# Patient Record
Sex: MALE | Race: BLACK OR AFRICAN AMERICAN | NOT HISPANIC OR LATINO | Employment: OTHER | URBAN - METROPOLITAN AREA
[De-identification: names, ages, dates, MRNs, and addresses within clinical notes are randomized per-mention and may not be internally consistent; named-entity substitution may affect disease eponyms.]

---

## 2017-08-03 ENCOUNTER — ALLSCRIPTS OFFICE VISIT (OUTPATIENT)
Dept: OTHER | Facility: OTHER | Age: 72
End: 2017-08-03

## 2018-01-14 VITALS
SYSTOLIC BLOOD PRESSURE: 140 MMHG | WEIGHT: 208 LBS | BODY MASS INDEX: 27.57 KG/M2 | HEART RATE: 64 BPM | OXYGEN SATURATION: 97 % | DIASTOLIC BLOOD PRESSURE: 78 MMHG | HEIGHT: 73 IN

## 2018-08-08 ENCOUNTER — OFFICE VISIT (OUTPATIENT)
Dept: CARDIOLOGY CLINIC | Facility: CLINIC | Age: 73
End: 2018-08-08
Payer: MEDICARE

## 2018-08-08 VITALS
HEART RATE: 61 BPM | HEIGHT: 74 IN | SYSTOLIC BLOOD PRESSURE: 130 MMHG | DIASTOLIC BLOOD PRESSURE: 68 MMHG | WEIGHT: 205 LBS | BODY MASS INDEX: 26.31 KG/M2

## 2018-08-08 DIAGNOSIS — I44.7 LBBB (LEFT BUNDLE BRANCH BLOCK): Primary | ICD-10-CM

## 2018-08-08 DIAGNOSIS — I10 ESSENTIAL HYPERTENSION: ICD-10-CM

## 2018-08-08 DIAGNOSIS — R91.1 PULMONARY NODULE: ICD-10-CM

## 2018-08-08 PROCEDURE — 99214 OFFICE O/P EST MOD 30 MIN: CPT | Performed by: INTERNAL MEDICINE

## 2018-08-08 PROCEDURE — 93000 ELECTROCARDIOGRAM COMPLETE: CPT | Performed by: INTERNAL MEDICINE

## 2018-08-08 RX ORDER — CITALOPRAM 40 MG/1
20 TABLET ORAL DAILY
COMMUNITY

## 2018-08-08 RX ORDER — BRIMONIDINE TARTRATE 2 MG/ML
SOLUTION/ DROPS OPHTHALMIC
COMMUNITY
Start: 2015-07-28

## 2018-08-08 RX ORDER — LISINOPRIL AND HYDROCHLOROTHIAZIDE 12.5; 1 MG/1; MG/1
1 TABLET ORAL DAILY
COMMUNITY

## 2018-08-08 RX ORDER — AMLODIPINE BESYLATE 5 MG/1
5 TABLET ORAL DAILY
COMMUNITY
Start: 2017-08-04

## 2018-08-08 NOTE — PROGRESS NOTES
Cardiology Follow Up Visit    Reji Childers  1945  [id number]  [facility] [id number]    1  LBBB (left bundle branch block)  POCT ECG   2  Essential hypertension     3  Pulmonary nodule  CT chest w contrast       Interval History:      patient presents to office for follow-up of his central hypertension and left bundle branch block  Patient has had no syncopal episodes or evidence of other conduction disease  Blood pressures been well controlled on listed therapy below  Did undergo coronary CT scanning which showed no evidence of any atherosclerotic coronary artery disease of significance  Coincidental pulmonary nodules noted  Asymptomatic  Has had problems with radiographic imaging his he cannot stay still  He required anesthesia  he has absolutely no symptoms of angina, no shortness of breath no PND orthopnea or chest discomfort  No lightheadedness dizziness    Patient Active Problem List   Diagnosis    LBBB (left bundle branch block)    Essential hypertension     Past Medical History:   Diagnosis Date    Benign essential hypertension     Diabetes mellitus (HCC)     Hyperlipidemia     LBBB (left bundle branch block)      Social History     Social History    Marital status: /Civil Union     Spouse name: N/A    Number of children: N/A    Years of education: N/A     Occupational History    Not on file  Social History Main Topics    Smoking status: Former Smoker     Types: Cigarettes     Quit date: 1969    Smokeless tobacco: Never Used    Alcohol use Not on file    Drug use: Unknown    Sexual activity: Not on file     Other Topics Concern    Not on file     Social History Narrative    No narrative on file      No family history on file  No past surgical history on file      Current Outpatient Prescriptions:     amLODIPine (NORVASC) 5 mg tablet, Take 5 mg by mouth, Disp: , Rfl:    aspirin 81 MG tablet, Take 1 tablet by mouth daily, Disp: , Rfl:     brimonidine tartrate 0 2 % ophthalmic solution, , Disp: , Rfl:     Cholecalciferol 2000 units CAPS, Take 2,000 Units by mouth, Disp: , Rfl:     citalopram (CeleXA) 40 mg tablet, Take 1 tablet by mouth daily, Disp: , Rfl:     gabapentin, once-daily, (GRALISE) 300 MG tablet, Take by mouth, Disp: , Rfl:     insulin aspart (NovoLOG) 100 Units/mL injection pen, Inject 31 Units under the skin, Disp: , Rfl:     insulin glargine (LANTUS SOLOSTAR) 100 units/mL injection pen, Inject 35 Units under the skin, Disp: , Rfl:     lisinopril-hydrochlorothiazide (PRINZIDE,ZESTORETIC) 10-12 5 MG per tablet, Take 1 tablet by mouth daily, Disp: , Rfl:   No Known Allergies      Social, Family, Medication history reviewed and updated as necessary      Labs:     Lab Results   Component Value Date     05/01/2015    K 4 0 05/01/2015     05/01/2015    CO2 29 05/01/2015    BUN 17 05/01/2015    CREATININE 1 31 (H) 05/01/2015    GLUCOSE 115 05/01/2015    CALCIUM 9 1 05/01/2015       Lab Results   Component Value Date    WBC 8 53 05/01/2015    HGB 15 3 05/01/2015    HCT 45 1 05/01/2015     05/01/2015       No results found for: CHOL  No results found for: HDL  No results found for: LDLCALC  No results found for: TRIG    No results found for: ALT, AST    Lab Results   Component Value Date    INR 0 98 05/01/2015       No results found for: NTBNP    No results found for: HGBA1C        Imaging: Reviewed in epic      ECG: Sinus rhythm left bundle-branch block    Review of Systems:  14 systems reviewed and negative with exception of the above  Review of Systems    PHYSICAL EXAM:    Physical Exam    Vitals:    08/08/18 1350   BP: 130/68   Pulse: 61     Body mass index is 26 32 kg/m²    Weight (last 2 days)     Date/Time   Weight    08/08/18 1350  93 (205)              Gen: No acute distress  HEENT: anicteric, mucous membranes moist  Neck: supple, no jugular venous distention, or carotid bruit  Heart: regular, normal s1 and s2, no murmur/rub or gallop  Lungs :clear to auscultation bilaterally, no rales/rhonchi or wheeze  Abdomen: soft nontender, normoactive bowel sounds, no organomegaly  Ext: warm and perfused, normal femoral pulses, no edema, or clubbing  Skin: warm, no rashes  Neuro: AAO x 3, no focal findings  Psychiatric: normal affect  Musculoskeletal: no obvious joint deformities  Discussion/Summary:     1  Essential hypertension, controlled    2  Coincidental pulmonary nodules,  Found on coronary CT scanning, no significant atherosclerotic coronary artery disease,  2015    3  Chronic kidney disease, stage III     plan:  Continue low-salt sensible diet and daily activity  Follow-up CT scan will be arranged  Patient states he needs anesthesia for CT scanning as he cannot stay still  This was ordered with anesthesia  Baseline renal insufficiency has been stable with creatinine 1 36 has evidence by labs performed at [facility] on July 3rd

## 2018-08-29 ENCOUNTER — TRANSCRIBE ORDERS (OUTPATIENT)
Dept: RADIOLOGY | Facility: HOSPITAL | Age: 73
End: 2018-08-29

## 2018-08-29 ENCOUNTER — TELEPHONE (OUTPATIENT)
Dept: CARDIOLOGY CLINIC | Facility: CLINIC | Age: 73
End: 2018-08-29

## 2018-08-29 ENCOUNTER — HOSPITAL ENCOUNTER (OUTPATIENT)
Dept: RADIOLOGY | Facility: HOSPITAL | Age: 73
Discharge: HOME | End: 2018-08-29
Attending: INTERNAL MEDICINE

## 2018-08-29 DIAGNOSIS — R91.1 PULMONARY NODULE: ICD-10-CM

## 2019-08-08 ENCOUNTER — OFFICE VISIT (OUTPATIENT)
Dept: CARDIOLOGY CLINIC | Facility: CLINIC | Age: 74
End: 2019-08-08
Payer: MEDICARE

## 2019-08-08 VITALS
WEIGHT: 197 LBS | BODY MASS INDEX: 25.28 KG/M2 | HEART RATE: 74 BPM | SYSTOLIC BLOOD PRESSURE: 140 MMHG | DIASTOLIC BLOOD PRESSURE: 64 MMHG | HEIGHT: 74 IN

## 2019-08-08 DIAGNOSIS — R91.1 PULMONARY NODULE: ICD-10-CM

## 2019-08-08 DIAGNOSIS — I10 ESSENTIAL HYPERTENSION: Primary | ICD-10-CM

## 2019-08-08 DIAGNOSIS — I44.7 LBBB (LEFT BUNDLE BRANCH BLOCK): ICD-10-CM

## 2019-08-08 PROCEDURE — 99213 OFFICE O/P EST LOW 20 MIN: CPT | Performed by: INTERNAL MEDICINE

## 2019-08-08 PROCEDURE — 93000 ELECTROCARDIOGRAM COMPLETE: CPT | Performed by: INTERNAL MEDICINE

## 2019-08-08 RX ORDER — PRAVASTATIN SODIUM 80 MG/1
80 TABLET ORAL DAILY
COMMUNITY

## 2019-08-08 RX ORDER — TRAZODONE HYDROCHLORIDE 100 MG/1
100 TABLET ORAL
COMMUNITY

## 2019-08-08 RX ORDER — BUSPIRONE HYDROCHLORIDE 5 MG/1
5 TABLET ORAL 2 TIMES DAILY
COMMUNITY

## 2019-08-08 NOTE — PROGRESS NOTES
Cardiology Follow Up Visit    Thierno Meza  1945  [id number]  [address] [facility]    1  Essential hypertension  POCT ECG   2  Pulmonary nodule  CT chest w contrast         Discussion/Summary:    1  No left bundle-branch block  A  Normal LVEF    2  Essential hypertension, overall controlled, systolic top-normal but diastolic 64 mmHg    3  Pulmonary nodules, CT scan performed last year on unclear reason    4  Coronary CTA no atherosclerotic disease 2015    Plan:  Ordered chest CT with anesthesia  Patient somehow did not get this performed last year  Do not know the details  BMP ordered  From a cardiac standpoint doing well         Interval History:    Patient comes the office follow-up history of essential hypertension, known left bundle-branch block  Due to claustrophobia cannot have MRIs her CT scans  Had coronary CTA workup of his left bundle-branch block due the fact that he could not undergo stress testing  This showed no atherosclerotic coronary artery disease could  Pulmonary nodules noted  Did order CT scan with anesthesia last year  Unfortunately, did not get performed  No complaints  Continues to do well on any hypertensive medication  [facility]      Patient Active Problem List   Diagnosis    LBBB (left bundle branch block)    Essential hypertension     Past Medical History:   Diagnosis Date    Benign essential hypertension     Diabetes mellitus (HCC)     Hyperlipidemia     LBBB (left bundle branch block)      Social History     Socioeconomic History    Marital status: /Civil Union     Spouse name: Not on file    Number of children: Not on file    Years of education: Not on file    Highest education level: Not on file   Occupational History    Not on file   Social Needs    Financial resource strain: Not on file    Food insecurity:     Worry: Not on file     Inability: Not on file    Transportation needs:     Medical: Not on file     Non-medical: Not on file   Tobacco Use    Smoking status: Former Smoker     Types: Cigarettes     Last attempt to quit: 1969     Years since quittin 6    Smokeless tobacco: Never Used   Substance and Sexual Activity    Alcohol use: Not on file    Drug use: Not on file    Sexual activity: Not on file   Lifestyle    Physical activity:     Days per week: Not on file     Minutes per session: Not on file    Stress: Not on file   Relationships    Social connections:     Talks on phone: Not on file     Gets together: Not on file     Attends [employer] service: Not on file     Active member of club or organization: Not on file     Attends meetings of clubs or organizations: Not on file     Relationship status: Not on file    Intimate partner violence:     Fear of current or ex partner: Not on file     Emotionally abused: Not on file     Physically abused: Not on file     Forced sexual activity: Not on file   Other Topics Concern    Not on file   Social History Narrative    Not on file      No family history on file  No past surgical history on file      Current Outpatient Medications:     amLODIPine (NORVASC) 5 mg tablet, Take 5 mg by mouth daily , Disp: , Rfl:     aspirin 81 MG tablet, Take 1 tablet by mouth daily, Disp: , Rfl:     busPIRone (BUSPAR) 5 mg tablet, Take 5 mg by mouth 2 (two) times a day, Disp: , Rfl:     Cholecalciferol 2000 units CAPS, Take 2,000 Units by mouth every other day , Disp: , Rfl:     citalopram (CeleXA) 40 mg tablet, Take 20 mg by mouth daily , Disp: , Rfl:     gabapentin, once-daily, (GRALISE) 300 MG tablet, Take 600 mg by mouth daily at bedtime , Disp: , Rfl:     insulin aspart (NovoLOG) 100 Units/mL injection pen, Inject 31 Units under the skin, Disp: , Rfl:     insulin glargine (LANTUS SOLOSTAR) 100 units/mL injection pen, Inject 35 Units under the skin, Disp: , Rfl:    lisinopril-hydrochlorothiazide (PRINZIDE,ZESTORETIC) 10-12 5 MG per tablet, Take 1 tablet by mouth daily, Disp: , Rfl:     metFORMIN (GLUCOPHAGE) 500 mg tablet, Take 500 mg by mouth 2 (two) times a day with meals, Disp: , Rfl:     pravastatin (PRAVACHOL) 80 mg tablet, Take 80 mg by mouth daily, Disp: , Rfl:     traZODone (DESYREL) 100 mg tablet, Take 100 mg by mouth daily at bedtime, Disp: , Rfl:     brimonidine tartrate 0 2 % ophthalmic solution, , Disp: , Rfl:   No Known Allergies      Social, Family, Medication history reviewed and updated as necessary      Labs:     Lab Results   Component Value Date     05/01/2015    K 4 0 05/01/2015     05/01/2015    CO2 29 05/01/2015    BUN 17 05/01/2015    CREATININE 1 31 (H) 05/01/2015    GLUCOSE 115 05/01/2015    CALCIUM 9 1 05/01/2015       Lab Results   Component Value Date    WBC 8 53 05/01/2015    HGB 15 3 05/01/2015    HCT 45 1 05/01/2015     05/01/2015       No results found for: CHOL  No results found for: HDL  No results found for: LDLCALC  No results found for: LDLDIRECT          No results found for: TRIG    No results found for: ALT, AST    Lab Results   Component Value Date    INR 0 98 05/01/2015       No results found for: NTBNP    No results found for: HGBA1C        Imaging: Reviewed in epic        Review of Systems:  14 systems reviewed and negative with exception of the above         PHYSICAL EXAM:      Vitals:    08/08/19 1552   BP: 140/64   Pulse: 74     Body mass index is 25 29 kg/m²     Weight (last 2 days)     Date/Time   Weight    08/08/19 1552   89 4 (197)                Gen: No acute distress  HEENT: anicteric, mucous membranes moist  Neck: supple, no jugular venous distention, or carotid bruit  Heart: regular, normal s1 and s2, no murmur/rub or gallop  Lungs :clear to auscultation bilaterally, no rales/rhonchi or wheeze  Abdomen: soft nontender, normoactive bowel sounds, no organomegaly  Ext: warm and perfused, normal femoral pulses, no edema, or clubbing  Skin: warm, no rashes  Neuro: AAO x 3, no focal findings  Psychiatric: normal affect  Musculoskeletal: no obvious joint deformities

## 2019-08-12 ENCOUNTER — APPOINTMENT (OUTPATIENT)
Dept: LAB | Facility: AMBULARY SURGERY CENTER | Age: 74
End: 2019-08-12
Payer: MEDICARE

## 2019-08-12 DIAGNOSIS — R91.1 PULMONARY NODULE: ICD-10-CM

## 2019-08-12 DIAGNOSIS — I10 ESSENTIAL HYPERTENSION: ICD-10-CM

## 2019-08-12 LAB
ANION GAP SERPL CALCULATED.3IONS-SCNC: 4 MMOL/L (ref 4–13)
BUN SERPL-MCNC: 15 MG/DL (ref 5–25)
CALCIUM SERPL-MCNC: 8.8 MG/DL (ref 8.3–10.1)
CHLORIDE SERPL-SCNC: 104 MMOL/L (ref 100–108)
CO2 SERPL-SCNC: 30 MMOL/L (ref 21–32)
CREAT SERPL-MCNC: 1.31 MG/DL (ref 0.6–1.3)
GFR SERPL CREATININE-BSD FRML MDRD: 62 ML/MIN/1.73SQ M
GLUCOSE P FAST SERPL-MCNC: 169 MG/DL (ref 65–99)
POTASSIUM SERPL-SCNC: 3.8 MMOL/L (ref 3.5–5.3)
SODIUM SERPL-SCNC: 138 MMOL/L (ref 136–145)

## 2019-08-12 PROCEDURE — 80048 BASIC METABOLIC PNL TOTAL CA: CPT

## 2019-08-12 PROCEDURE — 36415 COLL VENOUS BLD VENIPUNCTURE: CPT

## 2019-08-15 ENCOUNTER — HOSPITAL ENCOUNTER (OUTPATIENT)
Dept: RADIOLOGY | Facility: HOSPITAL | Age: 74
Discharge: HOME | End: 2019-08-15
Attending: INTERNAL MEDICINE
Payer: MEDICARE

## 2019-08-15 DIAGNOSIS — R91.1 PULMONARY NODULE: ICD-10-CM

## 2019-08-15 PROCEDURE — 71260 CT THORAX DX C+: CPT

## 2019-08-15 RX ADMIN — IOHEXOL 80 ML: 350 INJECTION, SOLUTION INTRAVENOUS at 16:14

## 2019-12-11 ENCOUNTER — OFFICE VISIT (OUTPATIENT)
Dept: ENDOCRINOLOGY | Facility: CLINIC | Age: 74
End: 2019-12-11
Payer: MEDICARE

## 2019-12-11 VITALS
WEIGHT: 205.4 LBS | SYSTOLIC BLOOD PRESSURE: 130 MMHG | HEART RATE: 73 BPM | BODY MASS INDEX: 26.36 KG/M2 | DIASTOLIC BLOOD PRESSURE: 82 MMHG | HEIGHT: 74 IN

## 2019-12-11 DIAGNOSIS — E78.2 MIXED HYPERLIPIDEMIA: ICD-10-CM

## 2019-12-11 DIAGNOSIS — I10 ESSENTIAL HYPERTENSION: ICD-10-CM

## 2019-12-11 DIAGNOSIS — T38.3X5A HYPOGLYCEMIA DUE TO INSULIN: ICD-10-CM

## 2019-12-11 DIAGNOSIS — E16.0 HYPOGLYCEMIA DUE TO INSULIN: ICD-10-CM

## 2019-12-11 DIAGNOSIS — E11.65 TYPE 2 DIABETES MELLITUS WITH HYPERGLYCEMIA, WITH LONG-TERM CURRENT USE OF INSULIN (HCC): Primary | ICD-10-CM

## 2019-12-11 DIAGNOSIS — Z79.4 TYPE 2 DIABETES MELLITUS WITH HYPERGLYCEMIA, WITH LONG-TERM CURRENT USE OF INSULIN (HCC): Primary | ICD-10-CM

## 2019-12-11 PROCEDURE — 99204 OFFICE O/P NEW MOD 45 MIN: CPT | Performed by: INTERNAL MEDICINE

## 2019-12-11 NOTE — PROGRESS NOTES
New Diabetes Patient Note    CC: diabetes mellitus type 2     History of Present Illness:   Iman Becker is a 76 y o  male with a history of type 2 diabetes with long term use of insulin since   Patient reports complication of nephropathy otherwise denies any retinopathy or neuropathy  Denies recent illness or hospitalizations  Denies recent severe hypoglycemic or severe hyperglycemic episodes  Denies any issues with his current regimen  Home glucose monitoring: are performed regularly  He uses chas for monitoring of his blood sugars  He does not have any family history of diabetes  Patient uses [facility]  He has 10% of his blood sugars below 70, 68% of blood sugars are in the range of  and 22% of blood sugars are above 180  He admits to symptomatic hypoglycemias  Patient reports a few episodes of symptomatic hypoglycemia  He denies any family history of diabetes  Home blood glucose readings:   Before breakfast: 67,  Before lunch:   Before dinner: 140-260  Bedtime: 140-260     Current regimen: lantus 10 units daily BID, novolog 20 units with meals, metformin 500 mg BID    Last Eye Exam: scheduled 2019  Last Foot Exam: 10/2019    Has hypertension: Taking amlodipine, lisinopril/HCTZ  Has hyperlipidemia: Taking pravastatin  Thyroid disorders: no    Patient Active Problem List   Diagnosis    LBBB (left bundle branch block)    Essential hypertension    Pulmonary nodule      Past Medical History:   Diagnosis Date    Benign essential hypertension     Diabetes mellitus (HCC)     Hyperlipidemia     LBBB (left bundle branch block)       History reviewed  No pertinent surgical history  History reviewed  No pertinent family history    Social History     Tobacco Use    Smoking status: Former Smoker     Types: Cigarettes     Last attempt to quit: 1969     Years since quittin 9    Smokeless tobacco: Never Used   Substance Use Topics    Alcohol use: Not on file     No Known Allergies      Current Outpatient Medications:     amLODIPine (NORVASC) 5 mg tablet, Take 5 mg by mouth daily , Disp: , Rfl:     aspirin 81 MG tablet, Take 1 tablet by mouth daily, Disp: , Rfl:     brimonidine tartrate 0 2 % ophthalmic solution, , Disp: , Rfl:     busPIRone (BUSPAR) 5 mg tablet, Take 5 mg by mouth 2 (two) times a day, Disp: , Rfl:     Cholecalciferol 2000 units CAPS, Take 2,000 Units by mouth every other day , Disp: , Rfl:     citalopram (CeleXA) 40 mg tablet, Take 20 mg by mouth daily , Disp: , Rfl:     gabapentin, once-daily, (GRALISE) 300 MG tablet, Take 600 mg by mouth daily at bedtime , Disp: , Rfl:     insulin aspart (NovoLOG) 100 Units/mL injection pen, Inject 8 units with meals +scale, Disp: 5 pen, Rfl:     insulin glargine (LANTUS SOLOSTAR) 100 units/mL injection pen, Take 15 units in AM, Disp: 5 pen, Rfl:     lisinopril-hydrochlorothiazide (PRINZIDE,ZESTORETIC) 10-12 5 MG per tablet, Take 1 tablet by mouth daily, Disp: , Rfl:     metFORMIN (GLUCOPHAGE) 500 mg tablet, Take 500 mg by mouth 2 (two) times a day with meals, Disp: , Rfl:     pravastatin (PRAVACHOL) 80 mg tablet, Take 80 mg by mouth daily, Disp: , Rfl:     traZODone (DESYREL) 100 mg tablet, Take 100 mg by mouth daily at bedtime, Disp: , Rfl:     Review of Systems   Constitutional: Negative for diaphoresis, fatigue and unexpected weight change  HENT: Negative for congestion, postnasal drip and sore throat  Eyes: Negative for pain  Respiratory: Negative for cough, chest tightness and shortness of breath  Gastrointestinal: Positive for constipation  Negative for abdominal distention, abdominal pain, blood in stool, diarrhea and nausea  Endocrine: Negative for polydipsia and polyuria  Genitourinary: Negative for dysuria  Musculoskeletal: Negative for arthralgias and back pain  Neurological: Negative for dizziness, light-headedness and headaches     Psychiatric/Behavioral: Negative for agitation and behavioral problems  The patient is not nervous/anxious  Physical Exam:  Body mass index is 26 37 kg/m²  /82   Pulse 73   Ht 6' 2" (1 88 m)   Wt 93 2 kg (205 lb 6 4 oz)   BMI 26 37 kg/m²    Wt Readings from Last 3 Encounters:   12/11/19 93 2 kg (205 lb 6 4 oz)   08/08/19 89 4 kg (197 lb)   08/08/18 93 kg (205 lb)       Physical Exam  Diabetic Foot Exam    Labs:   No results found for: HGBA1C  Lab Results   Component Value Date    CREATININE 1 31 (H) 08/12/2019    CREATININE 1 31 (H) 05/01/2015    BUN 15 08/12/2019     05/01/2015    K 3 8 08/12/2019     08/12/2019    CO2 30 08/12/2019     eGFR   Date Value Ref Range Status   08/12/2019 62 ml/min/1 73sq m Final     No results found for: CHOL, HDL, LDL, TRIG, CHOLHDL  No results found for: ALT, AST, GGT, ALKPHOS, BILITOT  No results found for: FEZ5GBZMCBKM  No results found for: FREET4, TSI    Impression & Plan:    Diagnoses and all orders for this visit:    Type 2 diabetes mellitus with hyperglycemia, with long-term current use of insulin ([address] ) - most recent A1c 7 1  Patient reports episodes of hypoglycemia  I will decrease patient's insulin to Lantus 15 units in the morning and NovoLog 8 units with meals  I will see patient back in my office in 3 months  Patient is not interested in diabetic education and nutrition classes  -     Discontinue: insulin aspart (NovoLOG) 100 Units/mL injection pen; Inject 8 units with meals +scale  -     Discontinue: insulin glargine (LANTUS SOLOSTAR) 100 units/mL injection pen; Take 15 units in AM  -     Ambulatory referral to Diabetic Education; Future  -     insulin aspart (NovoLOG) 100 Units/mL injection pen; Inject 8 units with meals +scale  -     insulin glargine (LANTUS SOLOSTAR) 100 units/mL injection pen; Take 15 units in AM  -     HEMOGLOBIN A1C W/ EAG ESTIMATION Lab Collect; Future  -     Basic metabolic panel Lab Collect;  Future    Essential hypertension -blood pressure is stable, 130/82, continue current medical management    Mixed hyperlipidemia - continue pravastatin        Patient Instructions       INSULIN DOSAGE INSTRUCTIONS    Name: Domenico Clark                        : 1945  MRN #: [id number]    Your Current Insulin  and dose is: Before Breakfast Before Lunch Before Evening Meal Bedtime     Novolog Insulin   8   8 8    Regular, Apidra, Humalog orNovolog Sliding Scale:   <80              151-200 + 2 +2 +2    201-250 +4 +4 +4 +   251-300 +6 +6 +6 +   301-350 +8 +8 +8 +   >350 +10 +10 +10 +                     Lantus Insulin   15 units         Additional Instructions:   Please test your blood sugar:  _4_ Times per day  X_ Before Breakfast                _ Alternate Testing  X_ Before Lunch                _ 2 Hours After  Meal  X_ Before Evening Meal               _ 3 a m   x_ Before Bedtime Snack     Target Blood sugar range  mg/dl _  Call if your  blood sugar is less than _60_ or greater than _400__  Today's Date: 2019      Hypoglycemia instructions   Domenico Clark  2019  [id number]    Low Blood Sugar    Steps to treat low blood sugar  1  Test blood sugar if you have symptoms of low blood sugar:   Low Blood Sugar Symptoms:  o Sweaty  o Dizzy  o Rapid heartbeat  o Shaky    o Bad mood  o Hungry      2  Treat blood sugar less than 70 with 15 grams of fast-acting carbohydrate:   Examples of 15 grams Fast-Acting Carbohydrate:  o 4 oz juice  o 4 oz regular soda  o 3-4 glucose tablets (chew)  o 3-4 hard candies (chew)              3    Wait 15 minutes and test your blood sugar again           4   If blood sugar is less than 100, repeat steps 2-3       5  When your blood sugar is 100 or more, eat a snack if it will be longer than one hour until your next meal  The snack should be 15 grams of carbohydrate and a protein:   Examples of snacks:  o ½ sandwich  o 6 crackers with cheese  o Piece of fruit with cheese or peanut butter  o 6 crackers with peanut haydee          Discussed with the patient and all questioned fully answered  He will call me if any problems arise  Follow-up appointment in 3 months       Counseled patient on diagnostic results, prognosis, risk and benefit of treatment options, instruction for management, importance of treatment compliance, Risk  factor reduction and impressions    Mortimer Salk, MD

## 2019-12-11 NOTE — PATIENT INSTRUCTIONS
INSULIN DOSAGE INSTRUCTIONS    Name: Killian Kent                        : 1945  MRN #: [id number]    Your Current Insulin  and dose is: Before Breakfast Before Lunch Before Evening Meal Bedtime     Novolog Insulin   8   8 8    Regular, Apidra, Humalog orNovolog Sliding Scale:   <80              151-200 + 2 +2 +2    201-250 +4 +4 +4 +   251-300 +6 +6 +6 +   301-350 +8 +8 +8 +   >350 +10 +10 +10 +                     Lantus Insulin   15 units         Additional Instructions:   Please test your blood sugar:  _4_ Times per day  X_ Before Breakfast                _ Alternate Testing  X_ Before Lunch                _ 2 Hours After  Meal  X_ Before Evening Meal               _ 3 a m   x_ Before Bedtime Snack     Target Blood sugar range  mg/dl _  Call if your  blood sugar is less than _60_ or greater than _400__  Today's Date: 2019      Hypoglycemia instructions   Killian Kent  2019  [id number]    Low Blood Sugar    Steps to treat low blood sugar  1  Test blood sugar if you have symptoms of low blood sugar:   Low Blood Sugar Symptoms:  o Sweaty  o Dizzy  o Rapid heartbeat  o Shaky    o Bad mood  o Hungry      2  Treat blood sugar less than 70 with 15 grams of fast-acting carbohydrate:   Examples of 15 grams Fast-Acting Carbohydrate:  o 4 oz juice  o 4 oz regular soda  o 3-4 glucose tablets (chew)  o 3-4 hard candies (chew)              3    Wait 15 minutes and test your blood sugar again           4   If blood sugar is less than 100, repeat steps 2-3       5  When your blood sugar is 100 or more, eat a snack if it will be longer than one hour until your next meal  The snack should be 15 grams of carbohydrate and a protein:   Examples of snacks:  o ½ sandwich  o 6 crackers with cheese  o Piece of fruit with cheese or peanut butter  o 6 crackers with peanut butter

## 2020-01-16 ENCOUNTER — OFFICE VISIT (OUTPATIENT)
Dept: DIABETES SERVICES | Facility: CLINIC | Age: 75
End: 2020-01-16
Payer: MEDICARE

## 2020-01-16 VITALS — HEIGHT: 74 IN | BODY MASS INDEX: 26.37 KG/M2

## 2020-01-16 DIAGNOSIS — E11.65 TYPE 2 DIABETES MELLITUS WITH HYPERGLYCEMIA, WITH LONG-TERM CURRENT USE OF INSULIN (HCC): Primary | ICD-10-CM

## 2020-01-16 DIAGNOSIS — Z79.4 TYPE 2 DIABETES MELLITUS WITH HYPERGLYCEMIA, WITH LONG-TERM CURRENT USE OF INSULIN (HCC): Primary | ICD-10-CM

## 2020-01-16 PROCEDURE — G0109 DIAB MANAGE TRN IND/GROUP: HCPCS | Performed by: DIETITIAN, REGISTERED

## 2020-01-16 NOTE — PATIENT INSTRUCTIONS
Class Assessment AVS    Call to schedule to attend Living Well with Diabetes Classes after you check your schedule  Testing frequency: Before meals and before bedtime  Return for scheduled Medical Nutrition Therapy Appointment    Goal Blood Sugars:   Premeal , even better <110  2hr after a meal <180, even better <140  A1C <7%, even better <6 5%  Thank you for coming to the [facility] for education today  Please feel free to call with any questions or concerns      Amaya Kumar, [address] [place] [phone]

## 2020-01-16 NOTE — PROGRESS NOTES
Type 2 Diabetes Class Assessment    HPI: Met with Lj Love for DSME Initial visit  Saint [place] has Type 2 Diabetes  He reports being diagnosed with diabetes since 1988  Most recent HbA1c7 1%  Saint [place] currently uses CGM [email or web address] to monitor his blood glucose  Raji Schumacher was downloaded today and reveals average  mg/dL, in target range 76%, above target 22%, below target 2%  Pattern of hypoglycemia between 10 am and 2 pm, as well as 2 instances that occurred in the evening between 6 - 8 pm  Saint [place] is on basal-bolus insulin  Reviewed action, onset, peak, and duration of both Novolog and Lantus with [facility] [place] and instructed that a meal containing at least 45 grams of carbohydrate should be eaten within 15 minutes of taking Novolog in order to prevent hypoglycemia and that he should not take Novolog if skipping a meal  Reviewed signs and symptoms of hypoglycemia and 15/15 rule for treatment of hypoglycemia  Saint [place] states that he is not following a special diet currently and that he walks outside when it is warm 5 - 7 days per week for 45 minutes  He states that he wants to start walking around [place] when it is colder outside  When asked how he feels about making lifestyle changes at this time Saint [place] answered  "If it's going to help me live longer, I'll do what it takes to get my blood sugar where it needs to be and keep it there"  Saint Cairo was unsure of his schedule as to when he would be able to attend Living Well with Diabetes classes and was instructed to call Diabetes Education to schedule after he checked his appointment book at home  Scheduled appointments now show that Saint [place] has chosen to being attending classes starting April 2, 2020 at [place] [id number] and also has an initial [place] appointment scheduled for February 4, 2020  Saint [place] will call with any questions prior to that appointment      Diabetes Assessment  Visit Type: Initial visit  Present at Session: patient   Special Learning Needs: No  Barriers to Learning: hearing , has hearing aides  How do you feel about making lifestyle changes at this time? "If it's going to help me live longer, I'll do what it takes to get my blood sugar where it needs to be and keep it there "  How would you rate your current knowledge of diabetes? good  How confident are you that will be able to take better control of your diabetes?: very good    How long have you had diabetes? Since   Have you had diabetes education in the past?: Yes - [facility]  Do you have any family members with diabetes?: Yes - Brothers (11)  Do you monitor your blood sugar? yes  Type of blood sugar monitor: Freestyle Rosalinda  How old is your meter?: 2 years  How often do you test your blood sugars? :Scans 4 - 6 times per day  Do you keep a written record of your blood sugars? No, Rosalinda  Was downloaded  Blood sugar log with patient today and reviewed by educator?: Yes, reviewed download  Blood Sugar ranges:    Fastin - 184 mg/dL   Before meals: 84 - 220 mg/dL   2 hours after meals: n/a  Any financial concerns pertaining to your diabetes supplies, medication or care?: No  Have you ever experienced hypoglycemia?:  Yes, on Rosalinda download  Have you ever been hospitalized or gone to the ER due to your blood sugars?: Yes, once after he was first diagnosed in [facility] for a BG over 400 mg/dL  How do you treat low blood sugars?: sugar packs and vanilla wafers or drinks orange juice or soda (8 oz ) and eats 4 Ritz crackers and peanut butter  Educated on 15/15 rule  How do you treat high blood sugars? Not sure  Educated on exercise and drinking water  Do you wear a Diabetes I D ?: no  Where do you dispose of your sharps (needles,lancetes)? : puts them in an empty plastic jar or clorox bottle wraps cap with duct tape then throws in trash    Ht Readings from Last 1 Encounters:   20 6' 2" (1 88 m)     Wt Readings from Last 3 Encounters:   19 93 2 kg (205 lb 6 4 oz) 08/08/19 89 4 kg (197 lb)   08/08/18 93 kg (205 lb)        Body mass index is 26 37 kg/m²  No results found for: HGBA1C    No results found for: CHOL  No results found for: HDL  No results found for: LDLCALC  No results found for: TRIG  No results found for: CHOLHDL  No results found for: Moses Lynch    Weight Change: Yes thinks he lost some weight ans his wife says that he lost weight    Diet Assessment    Do you follow any special diet presently?: No  Who cooks at home?:  spouse  Who does the grocery shopping?: patient and spouse   How frequently do you eat out?: once per week    Activity Assessment    Exercise: Walks outside when it is warm 5 - 7 days per week for 45 minutes  Wants to start walking around [place] since it is cold out  Lifestyle/Social Assessment    Racial/ethnic group:                                       Primary Language: English  Marital Status:   Education Level: High School Graduate   Work status: Retired  Type of job and hours: n/a  Who lives in your household?: spouse  Who is you primary support person(s): spouse   Describe your quality of life currently?: good  Any concerns for your safety?: No  Any [employer]itan or cultural practices that may affect your diabetes care: No  Do you have a decrease or loss of hearing?: Yes - wears hearing aides  Do you have a decrease or loss of vision?: No  When was the last time you had an ophthalmology exam?: 1/6/2020 and 12/19/19  When was the last time you had dental exam?November 2019  Do you check your feet for cracks, sores, debris?: Yes  When was the last time you had podiatry or foot exam?: October 2019, goes again a week from today  Last flu shot?: October 2019  Pneumonia shot?: No      The patient's history was reviewed and updated as appropriate: allergies, current medications      Intervention    Diabetes Overview :   Jamilah Coates was instructed on basic concepts of diabetes, including identifying role of diabetes self management, basic pathophysiology and types of diabetes, A1c and blood sugar targets  Omayra Llanes has good understanding of material covered  Taking Medications: Instructed patient on action, side effects, efficacy, prescribed dosage and appropriate timing and frequency of administration of his diabetes medication  Omayra Llanes has good understanding of material covered  Monitoring Blood Sugars  Instructions for Meter Teaching- Patient instructed in the following:  Site selection and skin preparation, Loading strips and lancet device, meter activation, obtaining blood sample, test strip and lancet disposal and recording log book entries  Patient has good understanding of material covered and was able to test their own blood sugar in office today  Testing frequency: Encouraged pair testing  Test sugars before a meal and 2hr after the same meal, rotating between breakfast, lunch, and dinner  Test sugars twice a day (3 days a week, 7 days a week)  Goal Blood Sugars:   Premeal , even better <110  2hr after a meal <180, even better <140  A1C <7%, even better <6 5%  Hypoglycemia: Instructed patient on definition/risk of hypoglycemia, treatment, causes/symptoms, when to notify provider of lows, prevention of hypoglycemia and exercise precautions  Comments: Srinath Killian understanding of hypoglycemia concepts      Physical Activity: Discussed benefits of physical activity to optimize blood glucose control, encouraged activity at patient is physically able   Always consult a physician prior to starting an exercise program   Comments: Srinath Killian understanding of hypoglycemia concepts        Diabetes Education Record  Omayra Llanes received the following handouts: signs and symptoms of hypoglycemia and hyperglycemia, 15/15 rule, Know Your Blood Glucose Numbers, Nutrition Guidelines for Diabetes, [facility], 2020 Living Well with Diabetes Schedule, 2020 Diabetes Support Group Schedule      Patient response to instruction    Martin Shoemaker  Expected Compliancefair    Thank you for referring your patient to [facility], it was a pleasure working with them today  Please feel free to call with any questions or concerns      Start- Stop: 11:28 am - 12:30 pm  Total Minutes: 62 Minutes  Group or Individual Instruction: DSME - IA  Other: MD Baylee Wiley, [address] [facility] [id number]

## 2020-01-17 DIAGNOSIS — Z79.4 TYPE 2 DIABETES MELLITUS WITH HYPERGLYCEMIA, WITH LONG-TERM CURRENT USE OF INSULIN (HCC): ICD-10-CM

## 2020-01-17 DIAGNOSIS — E11.65 TYPE 2 DIABETES MELLITUS WITH HYPERGLYCEMIA, WITH LONG-TERM CURRENT USE OF INSULIN (HCC): ICD-10-CM

## 2020-01-17 NOTE — TELEPHONE ENCOUNTER
Reviewed, continues glucose monitor sensor  76% time blood sugars are in range  He is having hypoglycemia below 70 specially after breakfast    Patient is having low blood sugar after breakfast,  Please inform patient to reduce NovoLog, to 6 units with breakfast, take 8 units with lunch and dinner  Continue Lantus 15 units in the morning  Also we need tto change scale to  Scale - 150-200 -1 units, 201-250-2 units, 251-300 -3 units, 301-350-4 units, > 350- 5 units       Leann Rios MD

## 2020-02-04 ENCOUNTER — OFFICE VISIT (OUTPATIENT)
Dept: DIABETES SERVICES | Facility: CLINIC | Age: 75
End: 2020-02-04
Payer: MEDICARE

## 2020-02-04 VITALS — BODY MASS INDEX: 25.05 KG/M2 | HEIGHT: 74 IN | WEIGHT: 195.2 LBS

## 2020-02-04 DIAGNOSIS — Z79.4 TYPE 2 DIABETES MELLITUS WITH HYPERGLYCEMIA, WITH LONG-TERM CURRENT USE OF INSULIN (HCC): Primary | ICD-10-CM

## 2020-02-04 DIAGNOSIS — E11.65 TYPE 2 DIABETES MELLITUS WITH HYPERGLYCEMIA, WITH LONG-TERM CURRENT USE OF INSULIN (HCC): Primary | ICD-10-CM

## 2020-02-04 PROCEDURE — 97802 MEDICAL NUTRITION INDIV IN: CPT | Performed by: DIETITIAN, REGISTERED

## 2020-02-04 NOTE — PROGRESS NOTES
Medical Nutrition Therapy        Assessment    Visit Type: Initial visit    Chief complaint T2DM    HPI: Deyvi Payne is a 27-year-old male with a history of type 2 diabetes, hypertension, and hyperlipidemia  Most recent HbA1c 7 1%  During medication reviewed today it was revealed that Deyvi Weston has not been accurately following his most recent updated insulin regimen  He reports that he has been taking 7 units of NovoLog before meals and has reduced his Lantus dose to 8 units  Reviewed most recent insulin dose changes from January 17, 2020 with Deyvi Weston and his wife as they report being on clear as to the changes given to them  Reviewed with Deyvi Weston that current prescribed insulin regimen Novolog 6-8-8 plus scale; Lantus 15 units a m  Reviewed how to use updated sliding scale for NovoLog  Deyvi Weston was seen previously for a class assessment with living well with diabetes classes and has returned today for initial medical nutrition therapy appointment  Jimmy diet history reveals inconsistent carbohydrate intake, occasional meal skipping, excess consumption of added sugars, accessed sodium intake, and in adequate intake of vegetables, fruits, whole grains, and low-fat dairy  Currently meals range from 15 to 75 grams of carbohydrate  Deyvi Weston reports that he will often have either 4 oz of juice or occasionally 8 oz of regular soda at meals  He states that he will often skip lunch  When he does eat lunch carbohydrate intake is usually 15-30 g  Jorge Enamorado that if he skips lunch he should not take NovoLog for that meal   Explained basic pathophysiology of diabetes and impact of diet on blood glucose levels  Together we discussed what foods contain CHO, reading a food label, timing of meals, serving sizes, and the importance of consistent carbohydrate intake  Used the portion booklet to teach Deyvi Weston and his wife more about food groups and basic carbohydrate counting   Created an individualized meal plan for Deyvi Weston with 3 meals and 2 snacks providing 60 g carb have both breakfast and dinner, 45 g carb at lunch, and 15 g carb per snack  Put together sample meals for Ibrahima's reference  Kristal Hutchinson demonstrated good understanding and will call with any questions prior to follow-up appointment in 6 weeks  Ht Readings from Last 1 Encounters:   02/04/20 6' 2" (1 88 m)     Wt Readings from Last 2 Encounters:   02/04/20 88 5 kg (195 lb 3 2 oz)   12/11/19 93 2 kg (205 lb 6 4 oz)     Weight Change: Yes 10 lbs weight loss over last 1 5 months    Medical Diagnosis/ICD10:  E 11 65 type 2 diabetes with hyperglycemia with long-term current use of insulin    Barriers to Learning: vision and hearing    Do you follow any special diet presently?: No  Who shops: spouse  Who cooks: spouse    Food Log: Completed via the method of food recall    Breakfast:wakes 7:30 - 8 am  Eats 9:30 - 10 am  Pancakes (2 small) with sugar free syrup (less than 1 tbsp ), 4 slices of schmitz, 4 oz orange juice, coffee (splenda and milk whole 1 tbsp or less)   Morning Snack:n/a  Lunch:1 - 2 pm  Grilled cheese (wheat) or 1/2 turkey or balogna sandwich on whole wheat  Diet pepsi or cranberry or grape juice (6 oz)  Afternoon Snack: n/a  Dinner:5 pm  Chili (1 cup ) and 1/2 small corn muffin  Regular Pepsi 8 oz  Evening Snack: 10:30- 11 pm a few vanilla wafers (6)  Water  Beverages: coffee, water, juice, soda, diet soda  Eating out/Take out:once every 2 weeks  Exercise walks 45 minutes 4 - 5 days per week       Calorie needs 2,150 kcals/day Carbs:  45-60 g/meal, 15 g/snack         Nutrition Diagnosis:  Inconsistent carbohydrate intake  intake related to Food and nutrition related knowledge deficit concerning appropriate amount and timing of carbohydrate intake as evidenced by  Estimated carbohydrate intake that is different from recommended types or ingested on an irregular basis    Intervention: plate method, increased fiber intake, label reading, carbohydrate counting, increased plant based foods, meal planning, individualized meal plan and food diary     Treatment Goals: Patient understands education and recommendations, Patient will monitor food intake daily with tracker, Patient will consume 3 meals a day, Patient will increase their intake of plant based foods and Patient will count carbohydrates    Monitoring and evaluation:    Term code indicator  FH 1 6 3 Carbohydrate Intake Criteria: 60 g of carbohydrate at breakfast and dinner, 45 g of carbohydrate at lunch  15 g of carbohydrate at snacks  Term code indicator  FH 4 4 Mealtime Behavior Criteria: Three meals per day 4-5 hours apart  Two snacks per day at least 2 hours apart from meals  Patients Response to Instruction:  Tatyana Gunn  Expected Compliancefair    Thank you for coming to the [facility] for education today  Please feel free to call with any questions or concerns  Start- Stop: 2:16 p m - 3:21 p m    Total Minutes: 65 Minutes  Group or Individual Instruction: MNT - I  Other: MD Antonio Toribioassaoriana Kumar, [address] [facility] [address] [facility] [id number]

## 2020-02-04 NOTE — PATIENT INSTRUCTIONS
60 grams of carbohydrate at breakfast and dinner  45 grams of carbohydrate at lunch  15 grams of carbohydrate per snack    3 meals per day, 4 - 5 hours apart  2 snacks per day, at least 2 hours apart from meals  Please complete 3 day food record prior to follow up appointment in 6 weeks and bring with you to that appointment

## 2020-03-11 ENCOUNTER — APPOINTMENT (OUTPATIENT)
Dept: LAB | Facility: AMBULARY SURGERY CENTER | Age: 75
End: 2020-03-11
Payer: MEDICARE

## 2020-03-11 DIAGNOSIS — E11.65 TYPE 2 DIABETES MELLITUS WITH HYPERGLYCEMIA, WITH LONG-TERM CURRENT USE OF INSULIN (HCC): ICD-10-CM

## 2020-03-11 DIAGNOSIS — Z79.4 TYPE 2 DIABETES MELLITUS WITH HYPERGLYCEMIA, WITH LONG-TERM CURRENT USE OF INSULIN (HCC): ICD-10-CM

## 2020-03-11 LAB
ANION GAP SERPL CALCULATED.3IONS-SCNC: 5 MMOL/L (ref 4–13)
BUN SERPL-MCNC: 19 MG/DL (ref 5–25)
CALCIUM SERPL-MCNC: 9.4 MG/DL (ref 8.3–10.1)
CHLORIDE SERPL-SCNC: 105 MMOL/L (ref 100–108)
CO2 SERPL-SCNC: 31 MMOL/L (ref 21–32)
CREAT SERPL-MCNC: 1.27 MG/DL (ref 0.6–1.3)
EST. AVERAGE GLUCOSE BLD GHB EST-MCNC: 160 MG/DL
GFR SERPL CREATININE-BSD FRML MDRD: 64 ML/MIN/1.73SQ M
GLUCOSE P FAST SERPL-MCNC: 153 MG/DL (ref 65–99)
HBA1C MFR BLD: 7.2 %
POTASSIUM SERPL-SCNC: 3.9 MMOL/L (ref 3.5–5.3)
SODIUM SERPL-SCNC: 141 MMOL/L (ref 136–145)

## 2020-03-11 PROCEDURE — 80048 BASIC METABOLIC PNL TOTAL CA: CPT

## 2020-03-11 PROCEDURE — 83036 HEMOGLOBIN GLYCOSYLATED A1C: CPT

## 2020-03-11 PROCEDURE — 36415 COLL VENOUS BLD VENIPUNCTURE: CPT

## 2020-03-18 ENCOUNTER — OFFICE VISIT (OUTPATIENT)
Dept: ENDOCRINOLOGY | Facility: CLINIC | Age: 75
End: 2020-03-18
Payer: MEDICARE

## 2020-03-18 VITALS
HEART RATE: 80 BPM | WEIGHT: 191.4 LBS | DIASTOLIC BLOOD PRESSURE: 70 MMHG | SYSTOLIC BLOOD PRESSURE: 124 MMHG | HEIGHT: 74 IN | BODY MASS INDEX: 24.56 KG/M2 | TEMPERATURE: 99.7 F

## 2020-03-18 DIAGNOSIS — I10 ESSENTIAL HYPERTENSION: ICD-10-CM

## 2020-03-18 DIAGNOSIS — E11.65 TYPE 2 DIABETES MELLITUS WITH HYPERGLYCEMIA, WITH LONG-TERM CURRENT USE OF INSULIN (HCC): Primary | ICD-10-CM

## 2020-03-18 DIAGNOSIS — E10.65 TYPE 1 DIABETES MELLITUS WITH HYPERGLYCEMIA (HCC): Primary | ICD-10-CM

## 2020-03-18 DIAGNOSIS — E78.2 MIXED HYPERLIPIDEMIA: ICD-10-CM

## 2020-03-18 DIAGNOSIS — Z79.4 TYPE 2 DIABETES MELLITUS WITH HYPERGLYCEMIA, WITH LONG-TERM CURRENT USE OF INSULIN (HCC): Primary | ICD-10-CM

## 2020-03-18 PROCEDURE — 99214 OFFICE O/P EST MOD 30 MIN: CPT | Performed by: PHYSICIAN ASSISTANT

## 2020-03-18 RX ORDER — FLASH GLUCOSE SENSOR
1 KIT MISCELLANEOUS
COMMUNITY
End: 2020-03-18 | Stop reason: SDUPTHER

## 2020-03-18 RX ORDER — FLASH GLUCOSE SENSOR
1 KIT MISCELLANEOUS
Qty: 6 EACH | Refills: 1 | Status: SHIPPED | OUTPATIENT
Start: 2020-03-18 | End: 2020-09-28 | Stop reason: SDUPTHER

## 2020-03-18 NOTE — PROGRESS NOTES
Patient Progress Note      CC: DM1      Referring Provider  No referring provider defined for this encounter  History of Present Illness:   Ascencion Moreno is a 76 y o  male with a history of type 1 diabetes with long term use of insulin  Patient questions diagnosis of type 1 DM, states at his last visit and in the past couple years, he was told he may have type 2 diabetes  Diabetes course has been stable  Complications of DM: CKD  Denies recent illness or hospitalizations  Denies recent severe hypoglycemic or severe hyperglycemic episodes  Denies any issues with his current regimen  Home glucose monitoring: are performed regularly  Use Freestyle Rosalinda  Average glucose 130 mg/dl with variability of 30 5%  In target range 85%, above range 10%, below range 5%    Current regimen: Lantus 15 units daily, Novolog 6-8-8 units TID with meals, metformin 500 mg BID  compliant most of the time, denies any side effects from medications  Injects in: abdomen, thigh  Rotates sites: Yes  Hypoglycemic episodes: Yes, infrequent  H/o of hypoglycemia causing hospitalization or Intervention such as glucagon injection  or ambulance call :  Yes  Hypoglycemia symptoms: weakness, dizziness  Treatment of hypoglycemia:     Medic alert tag: recommended: Yes    Diabetes education: Yes  Diet: 3 meals per day, 1-2 snacks per day  Timing of meals is predictable  Diabetic diet compliance:  compliant most of the time  Activity: Daily activity is predictable: Yes  Tries to walk routinely  Further diabetic ROS: no polyuria or polydipsia, no chest pain, dyspnea, no numbness, tingling or pain in extremities        Ophthamology: goes to [facility]: goes 3 times a [facility]; [facility]    Has hypertension: on ACE inhibitor/ARB, compliant most of the time  Has hyperlipidemia: on statin - tolerating well, no myalgias  compliant most of the time, denies any side effects from medications    Thyroid disorders: No  History of pancreatitis: No    Patient Active Problem List   Diagnosis    LBBB (left bundle branch block)    Essential hypertension    Pulmonary nodule    Type 1 diabetes mellitus with hyperglycemia (HCC)    Mixed hyperlipidemia      Past Medical History:   Diagnosis Date    Benign essential hypertension     Diabetes mellitus (HCC)     Hyperlipidemia     LBBB (left bundle branch block)       History reviewed  No pertinent surgical history  History reviewed  No pertinent family history    Social History     Tobacco Use    Smoking status: Former Smoker     Types: Cigarettes     Last attempt to quit: 1969     Years since quittin 2    Smokeless tobacco: Never Used   Substance Use Topics    Alcohol use: Not Currently     No Known Allergies      Current Outpatient Medications:     amLODIPine (NORVASC) 5 mg tablet, Take 5 mg by mouth daily , Disp: , Rfl:     aspirin 81 MG tablet, Take 1 tablet by mouth daily, Disp: , Rfl:     brimonidine tartrate 0 2 % ophthalmic solution, , Disp: , Rfl:     busPIRone (BUSPAR) 5 mg tablet, Take 5 mg by mouth 2 (two) times a day, Disp: , Rfl:     Cholecalciferol 2000 units CAPS, Take 2,000 Units by mouth every other day , Disp: , Rfl:     citalopram (CeleXA) 40 mg tablet, Take 20 mg by mouth daily , Disp: , Rfl:     gabapentin, once-daily, (GRALISE) 300 MG tablet, Take 600 mg by mouth daily at bedtime , Disp: , Rfl:     insulin aspart (NovoLOG) 100 Units/mL injection pen, 5-7-7, Disp: 5 pen, Rfl:     insulin glargine (LANTUS SOLOSTAR) 100 units/mL injection pen, Lantus 15 units in the morning, Disp: 5 pen, Rfl:     lisinopril-hydrochlorothiazide (PRINZIDE,ZESTORETIC) 10-12 5 MG per tablet, Take 1 tablet by mouth daily, Disp: , Rfl:     metFORMIN (GLUCOPHAGE) 500 mg tablet, Take 500 mg by mouth 2 (two) times a day with meals, Disp: , Rfl:     pravastatin (PRAVACHOL) 80 mg tablet, Take 80 mg by mouth daily, Disp: , Rfl:     traZODone (DESYREL) 100 mg tablet, Take 100 mg by mouth daily at bedtime, Disp: , Rfl:     Continuous Blood Gluc Sensor (FREESTYLE MOLLY 14 DAY SENSOR) [facility], 1 Device by Does not apply route every 14 (fourteen) days, Disp: 6 each, Rfl: 1  Review of Systems   Constitutional: Negative for activity change, appetite change, fatigue and unexpected weight change  HENT: Negative for trouble swallowing  Eyes: Negative for visual disturbance (has cataracts)  Respiratory: Negative for shortness of breath  Cardiovascular: Negative for chest pain and palpitations  Gastrointestinal: Positive for constipation (occasional)  Negative for diarrhea  Endocrine: Negative for polydipsia and polyuria  Musculoskeletal: Positive for arthralgias (occasional)  Skin: Negative for wound  Neurological: Negative for numbness  Psychiatric/Behavioral: Negative  Physical Exam:  Body mass index is 24 57 kg/m²  /70   Pulse 80   Temp 99 7 °F (37 6 °C)   Ht 6' 2" (1 88 m)   Wt 86 8 kg (191 lb 6 4 oz)   BMI 24 57 kg/m²    Wt Readings from Last 3 Encounters:   03/18/20 86 8 kg (191 lb 6 4 oz)   02/04/20 88 5 kg (195 lb 3 2 oz)   12/11/19 93 2 kg (205 lb 6 4 oz)       Physical Exam   Constitutional: He appears well-developed and well-nourished  HENT:   Head: Normocephalic  Eyes: Pupils are equal, round, and reactive to light  EOM are normal  No scleral icterus  Neck: Neck supple  No thyromegaly present  Cardiovascular: Normal rate and regular rhythm  No murmur heard  Pulses:       Radial pulses are 2+ on the right side, and 2+ on the left side  Pulmonary/Chest: Effort normal and breath sounds normal  No respiratory distress  He has no wheezes  Neurological: He is alert  Skin: Skin is warm and dry  Psychiatric: He has a normal mood and affect  Nursing note and vitals reviewed            Labs:   Component      Latest Ref Rng & Units 3/11/2020   Sodium      136 - 145 mmol/L 141   Potassium      3 5 - 5 3 mmol/L 3 9   Chloride 100 - 108 mmol/L 105   CO2      21 - 32 mmol/L 31   Anion Gap      4 - 13 mmol/L 5   BUN      5 - 25 mg/dL 19   Creatinine      0 60 - 1 30 mg/dL 1 27   GLUCOSE FASTING      65 - 99 mg/dL 153 (H)   Calcium      8 3 - 10 1 mg/dL 9 4   eGFR      ml/min/1 73sq m 64   Hemoglobin A1C      Normal 3 8-5 6%; PreDiabetic 5 7-6 4%; Diabetic >=6 5%; Glycemic control for adults with diabetes <7 0% % 7 2 (H)   eAG, EST AVG Glucose      mg/dl 160       Plan:    Diagnoses and all orders for this visit:    Type 1 diabetes mellitus with hyperglycemia ([address] )  HGA1C 7 2%  Worsened  Treatment regimen: Occasional low BG after meals  Decrease Novolog to 5-7-7 units TID with meals plus scale  Discussed intensive insulin regimen does increase risk for hypoglycemia  Episodes of hypoglycemia can lead to permanent disability and death  Discussed risks/complications associated with uncontrolled diabetes  Advised to adhere to diabetic diet, and recommended staying active/exercising routinely as tolerated  Keep carbohydrates consistent to limit blood glucose fluctuations  Advised to call if blood sugars less than 70 mg/dl or over 300 mg/dl  Check blood glucose 3+ times a day  Discussed symptoms and treatment of hypoglycemia  Discussed use of CGM to collect additional blood glucose data to reveal trends and patterns that can be used to optimize treatment plan  Referred to diabetes/nutrition education  Recommended routine follow-up with podiatry and ophthalmology  Send log in 2 weeks  Ordered blood work to complete prior to next visit  -     Hemoglobin A1C; Future  -     Basic metabolic panel; Future  -     Glutamic acid decarboxylase; Future  -     C-peptide; Future  -     Anti-islet cell antibody; Future  -     insulin aspart (NovoLOG) 100 Units/mL injection pen; 5-7-7    Essential hypertension  Blood pressure well controlled, continue current treatment  -     Basic metabolic panel;  Future    Mixed hyperlipidemia  LDL 80  Continue statin therapy  Managed by PCP      Discussed with the patient diagnosis and treatment and all questions fully answered  He will call me if any problems arise  Counseled patient on diagnostic results, prognosis, risk and benefit of treatment options, instruction for management, importance of treatment compliance, risk factor reduction and impressions        Shaila Hdz PA-C

## 2020-03-18 NOTE — PATIENT INSTRUCTIONS
INSULIN DOSAGE INSTRUCTIONS    Name: Gale Arias                        : 1945  MRN #: [id number]    Your Current Insulin  and dose is: Before Breakfast Before Lunch Before Evening Meal Bedtime     Novolog Insulin   5 units     7 units   7 units    Regular, Apidra, Humalog orNovolog Sliding Scale:   <80              151-200 +2 +2 +2    201-250 +4 +4 +4 +   251-300 +6 +6 +6 +   301-350 +8 +8 +8 +   >350 +10 +10 +10 +     Lantus Insulin 15 units        Additional Instructions:   Please test your blood sugar:  _4_ Times per day  X_ Before Breakfast                _ Alternate Testing  X_ Before Lunch                _ 2 Hours After  Meal  X_ Before Evening Meal               _ 3 a m   x_ Before Bedtime Snack     Target Blood sugar range  mg/dl _  Call if your  blood sugar is less than _60_ or greater than _400__  Today's Date: 3/18/2020    Hypoglycemia instructions   Gale Arias  3/18/2020  [id number]    Low Blood Sugar    Steps to treat low blood sugar  1  Test blood sugar if you have symptoms of low blood sugar:   Low Blood Sugar Symptoms:  o Sweaty  o Dizzy  o Rapid heartbeat  o Shaky  o Bad mood  o Hungry      2  Treat blood sugar less than 70 with 15 grams of fast-acting carbohydrate:   Examples of 15 grams Fast-Acting Carbohydrate:  o 4 oz juice  o 4 oz regular soda  o 3-4 glucose tablets (chew)  o 3-4 hard candies (chew)          3  Wait 15 minutes and test your blood sugar again     4   If blood sugar is less than 100, repeat steps 2-3     5  When your blood sugar is 100 or more, eat a snack if it will be longer than one hour until your next meal  The snack should be 15 grams of carbohydrate and a protein:   Examples of snacks:  o ½ sandwich  o 6 crackers with cheese  o Piece of fruit with cheese or peanut butter  o 6 crackers with peanut butter

## 2020-03-25 ENCOUNTER — TELEMEDICINE (OUTPATIENT)
Dept: DIABETES SERVICES | Facility: CLINIC | Age: 75
End: 2020-03-25

## 2020-03-25 DIAGNOSIS — E11.65 TYPE 2 DIABETES MELLITUS WITH HYPERGLYCEMIA, WITH LONG-TERM CURRENT USE OF INSULIN (HCC): Primary | ICD-10-CM

## 2020-03-25 DIAGNOSIS — Z79.4 TYPE 2 DIABETES MELLITUS WITH HYPERGLYCEMIA, WITH LONG-TERM CURRENT USE OF INSULIN (HCC): Primary | ICD-10-CM

## 2020-03-25 NOTE — PROGRESS NOTES
Virtual Regular Visit    Problem List Items Addressed This Visit     None               Reason for visit is follow up Medical Nutrition Therapy for E11 65, Z79 4 (ICD-10-CM) - Type 2 diabetes mellitus with hyperglycemia, with long-term current use of insulin     Encounter provider Doris Bundy RD    Provider located at [address] [address]      Recent Visits  No visits were found meeting these conditions  Showing recent visits within past 7 days and meeting all other requirements     Future Appointments  No visits were found meeting these conditions  Showing future appointments within next 150 days and meeting all other requirements        After connecting through [employer], the patient was identified by name and date of birth  Drucella Gilford was informed that this is a telemedicine visit and that the visit is being conducted through telephone which may not be secure and therefore, might not be HIPAA-compliant  My office door was closed  No one else was in the room  He acknowledged consent and understanding of privacy and security of the telephone platform  The patient has agreed to participate and understands they can discontinue the visit at any time  Subjective  Drucella Gilford is a 76 y o  male with type 2 diabetes  See note below  Past Medical History:   Diagnosis Date    Benign essential hypertension     Diabetes mellitus (HCC)     Hyperlipidemia     LBBB (left bundle branch block)        No past surgical history on file      Current Outpatient Medications   Medication Sig Dispense Refill    amLODIPine (NORVASC) 5 mg tablet Take 5 mg by mouth daily       aspirin 81 MG tablet Take 1 tablet by mouth daily      brimonidine tartrate 0 2 % ophthalmic solution       busPIRone (BUSPAR) 5 mg tablet Take 5 mg by mouth 2 (two) times a day      Cholecalciferol 2000 units CAPS Take 2,000 Units by mouth every other day       citalopram (CeleXA) 40 mg tablet Take 20 mg by mouth daily       Continuous Blood Gluc Sensor (FREESTYLE MOLLY 14 DAY SENSOR) MISC 1 Device by Does not apply route every 14 (fourteen) days 6 each 1    gabapentin, once-daily, (GRALISE) 300 MG tablet Take 600 mg by mouth daily at bedtime       insulin aspart (NovoLOG) 100 Units/mL injection pen 5-7-7 5 pen     insulin glargine (LANTUS SOLOSTAR) 100 units/mL injection pen Lantus 15 units in the morning 5 pen     lisinopril-hydrochlorothiazide (PRINZIDE,ZESTORETIC) 10-12 5 MG per tablet Take 1 tablet by mouth daily      metFORMIN (GLUCOPHAGE) 500 mg tablet Take 500 mg by mouth 2 (two) times a day with meals      pravastatin (PRAVACHOL) 80 mg tablet Take 80 mg by mouth daily      traZODone (DESYREL) 100 mg tablet Take 100 mg by mouth daily at bedtime       No current facility-administered medications for this visit  No Known Allergies    Review of Systems    Physical Exam     I spent 30 minutes with the patient during this visit  Medical Nutrition Therapy      Assessment    Chief complaint T2DM    Visit Type: Follow-up visit    HPI:  Spoke with Ellen Palacios and his wife via telephone call for follow-up today  Most recent HbA1c has slightly increased to 7 2% from previously 7 1%  [facility] food record reveals inadequate carbohydrate intake, excess intake of sodium and saturated fat, and inadequate intake of fruits, vegetables, and low-fat dairy  Ellen Palacios has made modifications since his initial nutrition appointment that include eliminating regular juice except when he has a low blood sugar and consistently eating 3 meals per day  He has since started drinking [employer] cranberry drink which only contains 2 g of carbohydrate per 10 oz  Shaunabreezy Halero states that he remembered that he needs to eat a meal after administering fast acting insulin and therefore has been trying to eat 3 meals a day    Overall, [facility] meals provide between 15- 40 grams carbohydrate  Based on meal plan review and diet recall performed today, provided education about the importance of having at least 45 g carbohydrate at each meal in order to meet nutrition needs and prevent hypoglycemia  Jaylen De Paz reports episodes of hypoglycemia occasionally occurring around 2-3 p m  and also around 8:30 p m  Jaylen De Paz  reports that on occasion he will only have a 6 oz yogurt at lunch and there are also examples of dinner that contain only 15 g of carbohydrate  Discussed examples of how to modify meals to help meet recommended carbohydrate amounts  Examples include having 1 cup of starchy vegetables at dinner has opposed to a half cup and adding a half to 3/4 cup of fruit such as peaches or pineapple  Also discussed adding crackers with peanut butter and a serving of fruit at lunch when Jaylen De Paz is only having a yogurt  Jaylen De Paz and his wife verbalized good understanding of recommendations discussed today and will call with any questions prior to next follow-up appointment in 3 months  Ht Readings from Last 1 Encounters:   03/18/20 6' 2" (1 88 m)     Wt Readings from Last 2 Encounters:   03/18/20 86 8 kg (191 lb 6 4 oz)   02/04/20 88 5 kg (195 lb 3 2 oz)     Weight Change: No    Medical Diagnosis/reason for visit E11 65, Z79 4 (ICD-10-CM) - Type 2 diabetes mellitus with hyperglycemia, with long-term current use of insulin    Food Log: Completed via the method of food recall                 Breakfast: 10 am oatmeal (1 cup cooked) with 1 tbsp raisins and splenda, coffee (1/2 cup) with 2% milk and splenda OR home fries (1/2 cup) and schmitz (3 slices) and wheat toast (1 slice)OR sausage, eggs, and a biscuit OR waffle (1 eggo) with syrup (sugar free) and sliced ham and  pineapple chunks (3/4 cup)   Morning Snack:not usually  Lunch:1-3 pm flavored yoplait yogurt 6 oz  Or grilled cheese on whole wheat OR peanut butter and jelly on whole wheat made with 1 tbsp regular jelly   Ocean Spray diet cranberry or a diet pepsi soda to drink  Afternoon Snack: not usually   Dinner:5:30 - 6 pm   BLT Novi on whole wheat and 5 french fries  Ocean Spray diet cranberry to drink  OR baked pork chops (4 oz ) with french green beans (1/2 cup) and candied sweet potatoes (1/2 cup) OR Fried fish, creamed corn 1/2 cup and a small salad (lettuc tomato with [employer] dressing 1 tbsp )   Evening Snack: 8 :30 - 9 pm  pineapple chunks or peanut butter and 8 crackers     Beverages: diet cranberry, water, diet soda, coffee  Eating out/Take out:rarely lately, but normally once every 2 weeks  Maybe more in the winter  Exercise none due to COVID-19 and trying to stay inside    Calorie needs 2,150 kcals/day Carbs:  45-60 g/meal, 15 g/snack     Fat: 60g/day    Protein:70 g/day    Nutrition Diagnosis:  Inadequate carbohydrate intake  related to Food and nutrition related knowledge deficit concerning appropriate amount of dietary carbohydrate as evidenced by  Estimated carbohydrate intake less than recommended amounts    Intervention: reduced fat intake, label reading, carbohydrate counting, increased plant based foods, meal planning and food diary     Treatment Goals: Patient understands education and recommendations, Patient will monitor food intake daily with tracker, Patient will monitor fat intake, Patient will consume 3 meals a day, Patient will increase their intake of plant based foods and Patient will count carbohydrates    Monitoring and evaluation:    Term code indicator  FH 1 6 3 Carbohydrate Intake Criteria: 45-60 g of carbohydrate per meal   0-15 g of carbohydrate per snack  Term code indicator  FH 4 4 Mealtime Behavior Criteria: Three meals per day, 4-5 hours apart  1-2 snacks per day, at least 2 hours apart from meals  Patients Response to Instruction:  Comprehensiongood  Motivationgood  Expected Compliancegood     Start- Stop: 11:00 a m -11:30 a m    Total Minutes: 30 Minutes  Group or Individual Instruction: SLIME - I - FU  Other: Darrius Bedolla Carleen Rodriguez MD    Thank you for coming to the [facility] education today  Please feel free to call with any questions or concerns      Manuella Cabot, [address] [place] [facility] [address]  [address] [facility] [address]

## 2020-03-25 NOTE — PATIENT INSTRUCTIONS
Three meals per day, approximately 4-5 hours apart  1-2 snacks per day, at least 2 hours apart from    45-60 g of carbohydrate per meal   0-15 g of carbohydrate per snack  Please complete 3 day food record prior to next follow-up appointment in 3 months and bring with you to that appointment

## 2020-05-11 DIAGNOSIS — E10.65 TYPE 1 DIABETES MELLITUS WITH HYPERGLYCEMIA (HCC): ICD-10-CM

## 2020-05-26 DIAGNOSIS — E10.65 TYPE 1 DIABETES MELLITUS WITH HYPERGLYCEMIA (HCC): ICD-10-CM

## 2020-06-05 LAB
LEFT EYE DIABETIC RETINOPATHY: NORMAL
RIGHT EYE DIABETIC RETINOPATHY: NORMAL

## 2020-06-18 ENCOUNTER — APPOINTMENT (OUTPATIENT)
Dept: LAB | Facility: AMBULARY SURGERY CENTER | Age: 75
End: 2020-06-18
Payer: MEDICARE

## 2020-06-18 DIAGNOSIS — E10.65 TYPE 1 DIABETES MELLITUS WITH HYPERGLYCEMIA (HCC): ICD-10-CM

## 2020-06-18 DIAGNOSIS — I10 ESSENTIAL HYPERTENSION: ICD-10-CM

## 2020-06-18 LAB
ANION GAP SERPL CALCULATED.3IONS-SCNC: 2 MMOL/L (ref 4–13)
BUN SERPL-MCNC: 15 MG/DL (ref 5–25)
CALCIUM SERPL-MCNC: 9.5 MG/DL (ref 8.3–10.1)
CHLORIDE SERPL-SCNC: 106 MMOL/L (ref 100–108)
CO2 SERPL-SCNC: 31 MMOL/L (ref 21–32)
CREAT SERPL-MCNC: 1.31 MG/DL (ref 0.6–1.3)
EST. AVERAGE GLUCOSE BLD GHB EST-MCNC: 143 MG/DL
GFR SERPL CREATININE-BSD FRML MDRD: 62 ML/MIN/1.73SQ M
GLUCOSE P FAST SERPL-MCNC: 122 MG/DL (ref 65–99)
HBA1C MFR BLD: 6.6 %
POTASSIUM SERPL-SCNC: 4.2 MMOL/L (ref 3.5–5.3)
PSA SERPL-MCNC: 3.9 NG/ML (ref 0–4)
SODIUM SERPL-SCNC: 139 MMOL/L (ref 136–145)

## 2020-06-18 PROCEDURE — 83036 HEMOGLOBIN GLYCOSYLATED A1C: CPT

## 2020-06-18 PROCEDURE — 84153 ASSAY OF PSA TOTAL: CPT

## 2020-06-18 PROCEDURE — 83519 RIA NONANTIBODY: CPT

## 2020-06-18 PROCEDURE — 86341 ISLET CELL ANTIBODY: CPT

## 2020-06-18 PROCEDURE — 84681 ASSAY OF C-PEPTIDE: CPT

## 2020-06-18 PROCEDURE — 80048 BASIC METABOLIC PNL TOTAL CA: CPT

## 2020-06-18 PROCEDURE — 36415 COLL VENOUS BLD VENIPUNCTURE: CPT

## 2020-06-19 ENCOUNTER — TELEPHONE (OUTPATIENT)
Dept: DIABETES SERVICES | Facility: CLINIC | Age: 75
End: 2020-06-19

## 2020-06-19 LAB — C PEPTIDE SERPL-MCNC: 0.6 NG/ML (ref 1.1–4.4)

## 2020-06-22 LAB — PANC ISLET CELL AB TITR SER: NEGATIVE {TITER}

## 2020-06-24 LAB — GAD65 AB SER-ACNC: <5 U/ML (ref 0–5)

## 2020-06-25 ENCOUNTER — TELEMEDICINE (OUTPATIENT)
Dept: ENDOCRINOLOGY | Facility: CLINIC | Age: 75
End: 2020-06-25
Payer: MEDICARE

## 2020-06-25 DIAGNOSIS — E78.2 MIXED HYPERLIPIDEMIA: ICD-10-CM

## 2020-06-25 DIAGNOSIS — Z79.4 TYPE 2 DIABETES MELLITUS WITH HYPERGLYCEMIA, WITH LONG-TERM CURRENT USE OF INSULIN (HCC): ICD-10-CM

## 2020-06-25 DIAGNOSIS — I10 ESSENTIAL HYPERTENSION: ICD-10-CM

## 2020-06-25 DIAGNOSIS — E10.65 TYPE 1 DIABETES MELLITUS WITH HYPERGLYCEMIA (HCC): Primary | ICD-10-CM

## 2020-06-25 DIAGNOSIS — E11.65 TYPE 2 DIABETES MELLITUS WITH HYPERGLYCEMIA, WITH LONG-TERM CURRENT USE OF INSULIN (HCC): ICD-10-CM

## 2020-06-25 DIAGNOSIS — E10.65 TYPE 1 DIABETES MELLITUS WITH HYPERGLYCEMIA (HCC): ICD-10-CM

## 2020-06-25 PROCEDURE — 99214 OFFICE O/P EST MOD 30 MIN: CPT | Performed by: PHYSICIAN ASSISTANT

## 2020-07-01 ENCOUNTER — OFFICE VISIT (OUTPATIENT)
Dept: DIABETES SERVICES | Facility: CLINIC | Age: 75
End: 2020-07-01
Payer: MEDICARE

## 2020-07-01 VITALS — WEIGHT: 180.4 LBS | BODY MASS INDEX: 23.15 KG/M2 | HEIGHT: 74 IN

## 2020-07-01 DIAGNOSIS — E10.65 TYPE 1 DIABETES MELLITUS WITH HYPERGLYCEMIA (HCC): Primary | ICD-10-CM

## 2020-07-01 PROCEDURE — 97803 MED NUTRITION INDIV SUBSEQ: CPT | Performed by: DIETITIAN, REGISTERED

## 2020-07-01 NOTE — PATIENT INSTRUCTIONS
Continue with meal plan of 45-60 grams of carbohydrates per meal  3 meals per day, about 4-5 hours apart  Try to eliminate regular soda from your diet unless using to treat low blood sugar  Meals that have the regular Pepsi listed as part of them do equal about 60 grams of carb so you will need to replace them with more nutrient dense carbohydrates  Example would be having a sandwich on 2 slices whole grain bread and having 8-10 oz of milk and a serving of fruit (such as a small apple)  2 oz of rice (which should be about 1/3 cup cooked) equals about 15 grams of carbohydrates  There are times that this amount of rice and a crescent roll are the only carb foods at a meal which totals around 30 grams of carbohydrate  Try switching to brown rice (higher in fiber) and increasing brown rice to 2/3 cup unless there would be other carbohydrate foods at the meal     Try to incorporate more high fiber foods (indicated with * on pages 5-6 of the portion book)  Remember not to count carbohydrates in Non-starchy vegetables (Page 8 in the portion book)

## 2020-07-01 NOTE — PROGRESS NOTES
Medical Nutrition Therapy      Assessment    Chief complaint Diabetes Mellitus    Visit Type: Follow-up visit    HPI: Mercy Mcmillan returned for follow-up today  Most recent HbA1c has improved to 6 6% from previously 7 2%  Ibrahimas food record reveals excess consumption of sugar sweetened beverages, excess intake of carbohydrate at snacks, and inadequate intake of high-fiber fruit and low-fat dairy  Overall, Ibrahimas meals provide 30-75 grams of carbohydrate per meal  Based on meal plan review and food record provided education about eliminating sugars sweetened beverages such as regular soda d from Mercy Mcmillan' diet unless using to treat hypoglycemia  Most of Mercy Mcmillan' meals are within 45-60 g carbohydrate range and therefore by eliminating 8 oz of regular Pepsi from meals, we discussed how to replace these carbohydrates with more nutrient dense foods including high-fiber fruit and low-fat yogurt  Mercy Mcmillan also often consumes 1 cup of sherbert as his evening snack which contains 60 g of carbohydrate, recommended he not exceed 15-20 g carbohydrate per snack  There are examples in [address] [facility] record that only contained 30 g of carbohydrate, discussed switching from what rice to brown rice and having 2/3 cup along with his normal crescent role, a lean protein, and half platenon starchy vegetables would be an example of a balanced meal the provide the recommended amount of carbohydrate  Although this was a follow-up appointment, Mercy Mcmillan showed very little recollection of any previous carbohydrate counting and meal plan skills that we have worked on in the past   He states that his wife takes care of all that including completing his 3 day food record for him that he brought to today's appointment  However, his wife did not accompany him to today's appointment and much of today was spent reviewing basic concepts that were covered at initial medical nutrition therapy appointment    Mercy Mcmillan demonstrated fair understanding of concepts and recommendations discussed today and will call with any questions prior to next follow-up appointment 6 months  Ht Readings from Last 1 Encounters:   07/01/20 6' 2" (1 88 m)     Wt Readings from Last 2 Encounters:   07/01/20 81 8 kg (180 lb 6 4 oz)   03/18/20 86 8 kg (191 lb 6 4 oz)     Weight Change: Yes yes, weight was 191 lbs three and a half months ago, currently 180 4 lbs    Medical Diagnosis/reason for visit E11 65, Z79 4 (ICD-10-CM) - Type 2 diabetes mellitus with hyperglycemia, with long-term current use of insulin     Food Log:     Please see scanned 3 day food diary      Exercise has a pedal machine for his feet, does 1,000 repetitions per day    Calorie needs 2100 kcals/day Carbs: 45-60 g/meal, 15 g/snack     Fat: 60 g/day    Protein:70 g/day    Nutrition Diagnosis:  Undesirable food choices  related to Impaired cognitive ability, including learning disabilities, neurological or sensory impairment, and/or dementia as evidenced by Inability to select (i e  access), or unwillingness, or disinterest in selecting, food consistent with the guidelines    Intervention: plate method, increased fiber intake, label reading, carbohydrate counting, increased plant based foods, meal planning and food diary     Treatment Goals: Patient understands education and recommendations, Patient will monitor food intake daily with tracker, Patient will consume 25-35 grams of fiber a day, Patient will increase their intake of plant based foods and Patient will count carbohydrates    Monitoring and evaluation:    Term code indicator  FH 1 6 3 Carbohydrate Intake Criteria: Continue with meal plan of 45-60 g of carbohydrate per meal, and 0-15 g of carbohydrate per snack    Term code indicator  FH 1 3 1 Fluid/Beverage Intake Criteria: Eliminate sugar sweetened beverages from diet on the using a treat hypoglycemia    Patients Response to Instruction:  Rosales Hampton  Expected Compliancefair     Start- Stop: 3:34 p m -4:26 p m  Total Minutes: 52 Minutes  Group or Individual Instruction: MNT - I - FU  Other: Angelica Medeiros MD    Thank you for referring your patient to the [facility] for education today  Please feel free to call with any questions or concerns      May Linda, [address] [place] [address] [facility] [phone]

## 2020-08-10 ENCOUNTER — TELEPHONE (OUTPATIENT)
Dept: ENDOCRINOLOGY | Facility: CLINIC | Age: 75
End: 2020-08-10

## 2020-08-14 ENCOUNTER — OFFICE VISIT (OUTPATIENT)
Dept: CARDIOLOGY CLINIC | Facility: CLINIC | Age: 75
End: 2020-08-14
Payer: MEDICARE

## 2020-08-14 VITALS
HEIGHT: 74 IN | SYSTOLIC BLOOD PRESSURE: 112 MMHG | HEART RATE: 60 BPM | TEMPERATURE: 96.8 F | DIASTOLIC BLOOD PRESSURE: 58 MMHG | WEIGHT: 174 LBS | BODY MASS INDEX: 22.33 KG/M2

## 2020-08-14 DIAGNOSIS — I44.7 LBBB (LEFT BUNDLE BRANCH BLOCK): Primary | ICD-10-CM

## 2020-08-14 DIAGNOSIS — E78.2 MIXED HYPERLIPIDEMIA: ICD-10-CM

## 2020-08-14 DIAGNOSIS — I10 ESSENTIAL HYPERTENSION: ICD-10-CM

## 2020-08-14 PROCEDURE — 99214 OFFICE O/P EST MOD 30 MIN: CPT | Performed by: INTERNAL MEDICINE

## 2020-08-14 NOTE — PROGRESS NOTES
Cardiology Follow Up Visit    Janusz Uriostegui  1945  [id number]  [facility] [id number]  [phone]  [phone]    1  LBBB (left bundle branch block)     2  Essential hypertension     3  Mixed hyperlipidemia           Discussion/Summary:    1  No left bundle-branch block  A  Normal LVEF     2  Essential hypertension, overall controlled   3  Pulmonary nodules, CT c/w old granulomatous lung disease      4  Coronary CTA no atherosclerotic coronary arteryt disease 2015    Plan:  Patient continues to do well clinically  He is asymptomatic  Keep medications the same  Encouraged sensible/Mediterranean type diet and daily exercise/activity  Tries to do as much walking as he can  Routine follow-up        Interval History:    72-year-old male originally seen few years ago for left bundle-branch block    Workup with normal LVEF and due to claustrophobia could not have stress testing  Underwent coronary CTA which showed no evidence of obstructive epicardial coronary artery disease    Remains asymptomatic    Blood pressure is well controlled on current therapy  Has diabetic management per PCP which she states is well controlled  No syncope lightheadedness or dizziness  No history of other vascular disease      Patient Active Problem List   Diagnosis    LBBB (left bundle branch block)    Essential hypertension    Pulmonary nodule    Type 1 diabetes mellitus with hyperglycemia (HCC)    Mixed hyperlipidemia     Past Medical History:   Diagnosis Date    Benign essential hypertension     Diabetes mellitus (HCC)     Hyperlipidemia     LBBB (left bundle branch block)      Social History     Socioeconomic History    Marital status: /Civil Union     Spouse name: Not on file    Number of children: Not on file    Years of education: Not on file    Highest education level: Not on file   Occupational History    Not on file   Social Needs    Financial resource strain: Not on file    Food insecurity     Worry: Not on file     Inability: Not on file    Transportation needs     Medical: Not on file     Non-medical: Not on file   Tobacco Use    Smoking status: Former Smoker     Types: Cigarettes     Last attempt to quit: 1969     Years since quittin 6    Smokeless tobacco: Never Used   Substance and Sexual Activity    Alcohol use: Not Currently    Drug use: Not Currently    Sexual activity: Not on file   Lifestyle    Physical activity     Days per week: Not on file     Minutes per session: Not on file    Stress: Not on file   Relationships    Social connections     Talks on phone: Not on file     Gets together: Not on file     Attends Druze service: Not on file     Active member of club or organization: Not on file     Attends meetings of clubs or organizations: Not on file     Relationship status: Not on file    Intimate partner violence     Fear of current or ex partner: Not on file     Emotionally abused: Not on file     Physically abused: Not on file     Forced sexual activity: Not on file   Other Topics Concern    Not on file   Social History Narrative    Not on file      No family history on file  No past surgical history on file      Current Outpatient Medications:     amLODIPine (NORVASC) 5 mg tablet, Take 5 mg by mouth daily , Disp: , Rfl:     aspirin 81 MG tablet, Take 1 tablet by mouth daily, Disp: , Rfl:     brimonidine tartrate 0 2 % ophthalmic solution, , Disp: , Rfl:     Cholecalciferol 2000 units CAPS, Take 2,000 Units by mouth every other day , Disp: , Rfl:     gabapentin, once-daily, (GRALISE) 300 MG tablet, Take 600 mg by mouth daily at bedtime , Disp: , Rfl:     insulin aspart (NovoLOG) 100 Units/mL injection pen, Use 4 units at breakfast, lunch & dinner plus scale, Disp: 5 pen, Rfl:     insulin glargine (LANTUS SOLOSTAR) 100 units/mL injection pen, Lantus 12 units in the morning, Disp: 5 pen, Rfl:     lisinopril-hydrochlorothiazide (PRINZIDE,ZESTORETIC) 10-12 5 MG per tablet, Take 1 tablet by mouth daily, Disp: , Rfl:     metFORMIN (GLUCOPHAGE) 500 mg tablet, Take 500 mg by mouth 2 (two) times a day with meals, Disp: , Rfl:     pravastatin (PRAVACHOL) 80 mg tablet, Take 80 mg by mouth daily, Disp: , Rfl:     traZODone (DESYREL) 100 mg tablet, Take 100 mg by mouth daily at bedtime, Disp: , Rfl:     busPIRone (BUSPAR) 5 mg tablet, Take 5 mg by mouth 2 (two) times a day, Disp: , Rfl:     citalopram (CeleXA) 40 mg tablet, Take 20 mg by mouth daily , Disp: , Rfl:     Continuous Blood Gluc Sensor (FREESTYLE MOLLY 14 DAY SENSOR) MIS, 1 Device by Does not apply route every 14 (fourteen) days, Disp: 6 each, Rfl: 1  No Known Allergies      Social, Family, Medication history reviewed and updated as necessary      Labs:     Lab Results   Component Value Date     05/01/2015    K 4 2 06/18/2020     06/18/2020    CO2 31 06/18/2020    BUN 15 06/18/2020    CREATININE 1 31 (H) 06/18/2020    CREATININE 1 27 03/11/2020    GLUCOSE 115 05/01/2015    CALCIUM 9 5 06/18/2020       Lab Results   Component Value Date    WBC 8 53 05/01/2015    HGB 15 3 05/01/2015    HCT 45 1 05/01/2015     05/01/2015       No results found for: CHOL  No results found for: HDL  No results found for: LDLCALC  No results found for: LDLDIRECT          No results found for: TRIG    No results found for: ALT, AST    Lab Results   Component Value Date    INR 0 98 05/01/2015       No results found for: NTBNP    Lab Results   Component Value Date    HGBA1C 6 6 (H) 06/18/2020    HGBA1C 7 2 (H) 03/11/2020           Imaging: Reviewed in epic        Review of Systems:  14 systems reviewed and negative with exception of the above        PHYSICAL EXAM:      Vitals:    08/14/20 1002   BP: 112/58   Pulse: 60   Temp: (!) 96 8 °F (36 °C)     Body mass index is 22 34 kg/m²     Weight (last 2 days)     Date/Time   Weight 08/14/20 1002   78 9 (174)                Gen: No acute distress  HEENT: anicteric, mucous membranes moist  Neck: supple, no jugular venous distention, or carotid bruit  Heart: regular, normal s1 and s2, no murmur/rub or gallop  Lungs :clear to auscultation bilaterally, no rales/rhonchi or wheeze  Abdomen: soft nontender, normoactive bowel sounds, no organomegaly  Ext: warm and perfused, normal femoral pulses, no edema, or clubbing  Skin: warm, no rashes  Neuro: AAO x 3, no focal findings  Psychiatric: normal affect  Musculoskeletal: no obvious joint deformities

## 2020-08-24 ENCOUNTER — TELEPHONE (OUTPATIENT)
Dept: ENDOCRINOLOGY | Facility: CLINIC | Age: 75
End: 2020-08-24

## 2020-08-24 DIAGNOSIS — E11.65 TYPE 2 DIABETES MELLITUS WITH HYPERGLYCEMIA, WITH LONG-TERM CURRENT USE OF INSULIN (HCC): ICD-10-CM

## 2020-08-24 DIAGNOSIS — E10.65 TYPE 1 DIABETES MELLITUS WITH HYPERGLYCEMIA (HCC): ICD-10-CM

## 2020-08-24 DIAGNOSIS — Z79.4 TYPE 2 DIABETES MELLITUS WITH HYPERGLYCEMIA, WITH LONG-TERM CURRENT USE OF INSULIN (HCC): ICD-10-CM

## 2020-08-24 NOTE — TELEPHONE ENCOUNTER
Patient will drop off his blood  Sugar log  He has lost a  Total of 30  Pounds with in  A short period of  Time  Wife would like to know do he need to make adjustment in his  Medication

## 2020-09-28 ENCOUNTER — LAB (OUTPATIENT)
Dept: LAB | Facility: AMBULARY SURGERY CENTER | Age: 75
End: 2020-09-28
Payer: MEDICARE

## 2020-09-28 ENCOUNTER — APPOINTMENT (OUTPATIENT)
Dept: LAB | Facility: AMBULARY SURGERY CENTER | Age: 75
End: 2020-09-28
Payer: MEDICARE

## 2020-09-28 ENCOUNTER — TRANSCRIBE ORDERS (OUTPATIENT)
Dept: LAB | Facility: AMBULARY SURGERY CENTER | Age: 75
End: 2020-09-28

## 2020-09-28 DIAGNOSIS — I10 ESSENTIAL HYPERTENSION: ICD-10-CM

## 2020-09-28 DIAGNOSIS — R63.4 LOSS OF WEIGHT: Primary | ICD-10-CM

## 2020-09-28 DIAGNOSIS — R63.4 LOSS OF WEIGHT: ICD-10-CM

## 2020-09-28 DIAGNOSIS — E11.65 TYPE 2 DIABETES MELLITUS WITH HYPERGLYCEMIA, WITH LONG-TERM CURRENT USE OF INSULIN (HCC): ICD-10-CM

## 2020-09-28 DIAGNOSIS — Z79.4 TYPE 2 DIABETES MELLITUS WITH HYPERGLYCEMIA, WITH LONG-TERM CURRENT USE OF INSULIN (HCC): ICD-10-CM

## 2020-09-28 DIAGNOSIS — E10.65 TYPE 1 DIABETES MELLITUS WITH HYPERGLYCEMIA (HCC): ICD-10-CM

## 2020-09-28 LAB
ANION GAP SERPL CALCULATED.3IONS-SCNC: 5 MMOL/L (ref 4–13)
BUN SERPL-MCNC: 16 MG/DL (ref 5–25)
CALCIUM SERPL-MCNC: 9.3 MG/DL (ref 8.3–10.1)
CHLORIDE SERPL-SCNC: 106 MMOL/L (ref 100–108)
CO2 SERPL-SCNC: 28 MMOL/L (ref 21–32)
CORTIS SERPL-MCNC: 16.8 UG/DL
CREAT SERPL-MCNC: 1.14 MG/DL (ref 0.6–1.3)
EST. AVERAGE GLUCOSE BLD GHB EST-MCNC: 140 MG/DL
GFR SERPL CREATININE-BSD FRML MDRD: 73 ML/MIN/1.73SQ M
GLUCOSE P FAST SERPL-MCNC: 138 MG/DL (ref 65–99)
HBA1C MFR BLD: 6.5 %
POTASSIUM SERPL-SCNC: 3.8 MMOL/L (ref 3.5–5.3)
SODIUM SERPL-SCNC: 139 MMOL/L (ref 136–145)

## 2020-09-28 PROCEDURE — 83036 HEMOGLOBIN GLYCOSYLATED A1C: CPT

## 2020-09-28 PROCEDURE — 80048 BASIC METABOLIC PNL TOTAL CA: CPT

## 2020-09-28 PROCEDURE — 36415 COLL VENOUS BLD VENIPUNCTURE: CPT

## 2020-09-28 PROCEDURE — 82533 TOTAL CORTISOL: CPT

## 2020-09-28 RX ORDER — FLASH GLUCOSE SENSOR
1 KIT MISCELLANEOUS
Qty: 6 EACH | Refills: 1 | Status: SHIPPED | OUTPATIENT
Start: 2020-09-28 | End: 2021-03-15 | Stop reason: SDUPTHER

## 2020-10-06 ENCOUNTER — OFFICE VISIT (OUTPATIENT)
Dept: ENDOCRINOLOGY | Facility: CLINIC | Age: 75
End: 2020-10-06
Payer: MEDICARE

## 2020-10-06 VITALS
TEMPERATURE: 98.5 F | HEART RATE: 64 BPM | DIASTOLIC BLOOD PRESSURE: 58 MMHG | SYSTOLIC BLOOD PRESSURE: 110 MMHG | WEIGHT: 168 LBS | HEIGHT: 74 IN | BODY MASS INDEX: 21.56 KG/M2

## 2020-10-06 DIAGNOSIS — E10.65 TYPE 1 DIABETES MELLITUS WITH HYPERGLYCEMIA (HCC): Primary | ICD-10-CM

## 2020-10-06 DIAGNOSIS — E78.2 MIXED HYPERLIPIDEMIA: ICD-10-CM

## 2020-10-06 DIAGNOSIS — I10 ESSENTIAL HYPERTENSION: ICD-10-CM

## 2020-10-06 PROCEDURE — 99214 OFFICE O/P EST MOD 30 MIN: CPT | Performed by: PHYSICIAN ASSISTANT

## 2020-10-08 ENCOUNTER — TELEPHONE (OUTPATIENT)
Dept: ENDOCRINOLOGY | Facility: CLINIC | Age: 75
End: 2020-10-08

## 2020-11-27 DIAGNOSIS — E11.65 TYPE 2 DIABETES MELLITUS WITH HYPERGLYCEMIA, WITH LONG-TERM CURRENT USE OF INSULIN (HCC): ICD-10-CM

## 2020-11-27 DIAGNOSIS — E10.65 TYPE 1 DIABETES MELLITUS WITH HYPERGLYCEMIA (HCC): ICD-10-CM

## 2020-11-27 DIAGNOSIS — Z79.4 TYPE 2 DIABETES MELLITUS WITH HYPERGLYCEMIA, WITH LONG-TERM CURRENT USE OF INSULIN (HCC): ICD-10-CM

## 2020-12-03 LAB
LEFT EYE DIABETIC RETINOPATHY: NORMAL
RIGHT EYE DIABETIC RETINOPATHY: NORMAL

## 2021-01-04 ENCOUNTER — OFFICE VISIT (OUTPATIENT)
Dept: DIABETES SERVICES | Facility: CLINIC | Age: 76
End: 2021-01-04
Payer: MEDICARE

## 2021-01-04 VITALS — HEIGHT: 74 IN | TEMPERATURE: 98.2 F | WEIGHT: 166.8 LBS | BODY MASS INDEX: 21.41 KG/M2

## 2021-01-04 DIAGNOSIS — E11.65 TYPE 2 DIABETES MELLITUS WITH HYPERGLYCEMIA, WITH LONG-TERM CURRENT USE OF INSULIN (HCC): Primary | ICD-10-CM

## 2021-01-04 DIAGNOSIS — Z79.4 TYPE 2 DIABETES MELLITUS WITH HYPERGLYCEMIA, WITH LONG-TERM CURRENT USE OF INSULIN (HCC): Primary | ICD-10-CM

## 2021-01-04 DIAGNOSIS — E10.65 TYPE 1 DIABETES MELLITUS WITH HYPERGLYCEMIA (HCC): ICD-10-CM

## 2021-01-04 PROCEDURE — 97803 MED NUTRITION INDIV SUBSEQ: CPT | Performed by: DIETITIAN, REGISTERED

## 2021-01-04 NOTE — PROGRESS NOTES
Medical Nutrition Therapy      Assessment    Chief complaint Diabetes Mellitus    Visit Type: Follow-up visit    HPI: Beni Moore returned for follow-up today  HbA1c has improved to 6 5% from previously 6 9%  BG log provided today by patient, however, he did not bring his [employer] reader to download  BG log scanned under media, most readings within range  One example of hypoglycemia after lunch, likely due to inadequate carbohydrate intake  Beni Moore has experienced an unintenitonal 14 lb weight loss over the last 6 months  He reports that he has a good appetite but that he does tend to eat smaller portions and will go back and get more if he wants more  Jimmy food record reveals inconsistent carbohydrate intake, inadequate energy intake, and inadequate intake of fruit and vegetables  Carbohydrate intake recommendation is for 45-60 grams of carbohydrate per meal   Overall, Jimmy meals listed in his food record provide between 30-80 grams of carbohydrate  Based on meal plan review, discussion with Beni Moore, and food record provided education about the importance of consistency with carbohydrate intake and increasing caloric intake to prevent further weight loss  Discussed examples of how to increase calories and improve consisency with carbohydrate intake such as adding 8-10 oz of 2% milk to lunch which typically only contains 30 g carb from a sandwich  Discussed with Beni Moore that a glass of 2 % milk can be added to other meals as well that contain 45 g carb or less  Discussed adding a serving of high fiber fruit, such as strawberries, as an option as well for when carbohydrate is slightly below recommended amounts  Beni Moore demonstrated good understanding and will call with any questions prior to next follow up appointment in 6 months      Ht Readings from Last 1 Encounters:   10/06/20 6' 2" (1 88 m)     Wt Readings from Last 2 Encounters:   10/06/20 76 2 kg (168 lb)   08/14/20 78 9 kg (174 lb)     Weight Change: Yes 14 lb weight loss over last 6 months, unintentional    Medical Diagnosis/reason for visit E11 65, Z79 4 (ICD-10-CM) - Type 2 diabetes mellitus with hyperglycemia, with long-term current use of insulin     Food Log:  Please see scanned 3 day food diary    Calorie needs 2,000 kcals/day Carbs: 45-60 g/meal, 15-30 g/snack     Fat: 5 servings/day    Protein:10 oz/day    Nutrition Diagnosis:  Inconsistent carbohydrate intake  intake related to Food and nutrition related knowledge deficit concerning appropriate amount and timing of carbohydrate intake as evidenced by  Estimated carbohydrate intake that is different from recommended types or ingested on an irregular basis    Intervention: carbohydrate counting and meal planning     Treatment Goals: Patient understands education and recommendations and Patient will count carbohydrates    Monitoring and evaluation:    Term code indicator  FH 1 6 3 Carbohydrate Intake Criteria: 45-60 g carbohydrate per meal, 15-30 g carb per snack  Term code indicator  FH 4 4 Mealtime Behavior Criteria: 3 meals per day, 4-5 hours apart  Up to 3 snacks per day, at least 2 hours apart from meals  Patients Response to Instruction:  Amisha Mix  Expected Compliancegood    Thank you for coming to the [facility] for education today  Please feel free to call with any questions or concerns      Start: 9:45 am  Stop: 10:22 am  Referred by: MD Dottie Juárez, [address] [place] [address] Zev Herbert  [address] [place] [id number]

## 2021-01-04 NOTE — TELEPHONE ENCOUNTER
The medication instructions should say use scale at all meals if needed, not scale at lunch only  Please correct and clarify again with patient/wife if possibility of misunderstanding instructions         Shaila Hdz PA-C

## 2021-01-04 NOTE — TELEPHONE ENCOUNTER
BG is occasionally low before lunch and dinner  If currently using novolog 2 units at breakfast and dinner, then stop Novolog  Only use scale at meals if necessary  Continue Lantus at 9 units daily      Shaila Hdz PA-C

## 2021-01-04 NOTE — PATIENT INSTRUCTIONS
- Some of your meals are too low in carbohydrate, usually lunch and occasionally dinner   - Recommended amount of carbohydrates per meal is 45-60 grams per meal   - Examples of foods that you can add to help get more carbohydrate include 8 -10 oz 2% milk or 1 cup strawberries  - Using the 2% milk as an additional carbohydrate choice will also help add more calories back into your diet to help prevent further weight loss

## 2021-01-06 ENCOUNTER — LAB (OUTPATIENT)
Dept: LAB | Facility: AMBULARY SURGERY CENTER | Age: 76
End: 2021-01-06
Payer: MEDICARE

## 2021-01-06 ENCOUNTER — TELEPHONE (OUTPATIENT)
Dept: ENDOCRINOLOGY | Facility: CLINIC | Age: 76
End: 2021-01-06

## 2021-01-06 DIAGNOSIS — I10 ESSENTIAL HYPERTENSION: ICD-10-CM

## 2021-01-06 DIAGNOSIS — E10.65 TYPE 1 DIABETES MELLITUS WITH HYPERGLYCEMIA (HCC): ICD-10-CM

## 2021-01-06 LAB
ANION GAP SERPL CALCULATED.3IONS-SCNC: 6 MMOL/L (ref 4–13)
BUN SERPL-MCNC: 20 MG/DL (ref 5–25)
CALCIUM SERPL-MCNC: 9.3 MG/DL (ref 8.3–10.1)
CHLORIDE SERPL-SCNC: 104 MMOL/L (ref 100–108)
CO2 SERPL-SCNC: 29 MMOL/L (ref 21–32)
CREAT SERPL-MCNC: 1.18 MG/DL (ref 0.6–1.3)
CREAT UR-MCNC: 217 MG/DL
EST. AVERAGE GLUCOSE BLD GHB EST-MCNC: 140 MG/DL
GFR SERPL CREATININE-BSD FRML MDRD: 69 ML/MIN/1.73SQ M
GLUCOSE P FAST SERPL-MCNC: 147 MG/DL (ref 65–99)
HBA1C MFR BLD: 6.5 %
MICROALBUMIN UR-MCNC: 12 MG/L (ref 0–20)
MICROALBUMIN/CREAT 24H UR: 6 MG/G CREATININE (ref 0–30)
POTASSIUM SERPL-SCNC: 4 MMOL/L (ref 3.5–5.3)
SODIUM SERPL-SCNC: 139 MMOL/L (ref 136–145)

## 2021-01-06 PROCEDURE — 80048 BASIC METABOLIC PNL TOTAL CA: CPT

## 2021-01-06 PROCEDURE — 83036 HEMOGLOBIN GLYCOSYLATED A1C: CPT

## 2021-01-06 PROCEDURE — 84681 ASSAY OF C-PEPTIDE: CPT

## 2021-01-06 PROCEDURE — 82570 ASSAY OF URINE CREATININE: CPT

## 2021-01-06 PROCEDURE — 82043 UR ALBUMIN QUANTITATIVE: CPT

## 2021-01-06 PROCEDURE — 36415 COLL VENOUS BLD VENIPUNCTURE: CPT

## 2021-01-07 LAB — C PEPTIDE SERPL-MCNC: 1.1 NG/ML (ref 1.1–4.4)

## 2021-01-13 ENCOUNTER — OFFICE VISIT (OUTPATIENT)
Dept: ENDOCRINOLOGY | Facility: CLINIC | Age: 76
End: 2021-01-13
Payer: MEDICARE

## 2021-01-13 VITALS
DIASTOLIC BLOOD PRESSURE: 88 MMHG | HEIGHT: 74 IN | WEIGHT: 169 LBS | BODY MASS INDEX: 21.69 KG/M2 | TEMPERATURE: 98 F | HEART RATE: 64 BPM | SYSTOLIC BLOOD PRESSURE: 136 MMHG

## 2021-01-13 DIAGNOSIS — E78.2 MIXED HYPERLIPIDEMIA: ICD-10-CM

## 2021-01-13 DIAGNOSIS — E10.65 TYPE 1 DIABETES MELLITUS WITH HYPERGLYCEMIA (HCC): Primary | ICD-10-CM

## 2021-01-13 DIAGNOSIS — I10 ESSENTIAL HYPERTENSION: ICD-10-CM

## 2021-01-13 PROCEDURE — 99214 OFFICE O/P EST MOD 30 MIN: CPT | Performed by: PHYSICIAN ASSISTANT

## 2021-01-13 NOTE — PROGRESS NOTES
Patient Progress Note      CC: DM      Referring Provider  Jonah Olmstead Md  [address] [place] [address],  [address] [place] [address]     History of Present Illness:   Mervat Walls is a 76 y o  male with a history of type 1 diabetes with long term use of insulin  His antibodies are normal but C-peptide is low  Diabetes course has been stable  Complications of DM: CKD  Denies recent illness or hospitalizations  Denies any issues with his current regimen  Home glucose monitoring: are performed regularly  Use Freestyle Rosalinda  Average glucose 157 mg/dl with variability of 27 4%  In target range 72%, above range 27%, below range 1%       Current regimen: Lantus 9 units daily, Novolog scale only at meals PRN (he has not been using the scale because he was not aware that he should use it PRN), metformin 500 mg daily  He previously was on Novolog 2 units BID but that was stopped a little over a week ago as BG levels were tightly controlled and sometimes low   compliant most of the time, denies any side effects from medications  Injects in: thigh  Rotates sites: Yes  Hypoglycemic episodes: Yes, rare  H/o of hypoglycemia causing hospitalization or Intervention such as glucagon injection or ambulance call : Yes  Hypoglycemia symptoms: weakness, dizziness  Treatment of hypoglycemia: discussed treatment      Medic alert tag: recommended: Yes     Diabetes education: Yes  Diet: 3 meals per day, 1-2 snacks per day  Timing of meals is predictable  Diabetic diet compliance: compliant most of the time  Activity: Daily activity is predictable: Yes  No routine exercise due to cold weather  Ophthamology: goes to [facility], Dec 2020  Podiatry: goes 3 times a year; [facility]      Has hypertension: on ACE inhibitor/ARB, compliant most of the time  Has hyperlipidemia: on statin - tolerating well, no myalgias  compliant most of the time, denies any side effects from medications    Thyroid disorders: No  History of pancreatitis: No      Patient Active Problem List   Diagnosis    LBBB (left bundle branch block)    Essential hypertension    Pulmonary nodule    Type 1 diabetes mellitus with hyperglycemia (HCC)    Mixed hyperlipidemia      Past Medical History:   Diagnosis Date    Benign essential hypertension     Diabetes mellitus (HCC)     Hyperlipidemia     LBBB (left bundle branch block)       History reviewed  No pertinent surgical history     Family History   Problem Relation Age of Onset    Diabetes unspecified Father     Diabetes unspecified Brother      Social History     Tobacco Use    Smoking status: Former Smoker     Types: Cigarettes     Quit date:      Years since quittin 0    Smokeless tobacco: Never Used   Substance Use Topics    Alcohol use: Not Currently     No Known Allergies      Current Outpatient Medications:     amLODIPine (NORVASC) 5 mg tablet, Take 5 mg by mouth daily , Disp: , Rfl:     aspirin 81 MG tablet, Take 1 tablet by mouth daily, Disp: , Rfl:     brimonidine tartrate 0 2 % ophthalmic solution, , Disp: , Rfl:     busPIRone (BUSPAR) 5 mg tablet, Take 5 mg by mouth 2 (two) times a day, Disp: , Rfl:     Cholecalciferol 2000 units CAPS, Take 2,000 Units by mouth every other day , Disp: , Rfl:     citalopram (CeleXA) 40 mg tablet, Take 20 mg by mouth daily , Disp: , Rfl:     Continuous Blood Gluc Sensor (FreeStyle Rosalinda 14 Day Sensor) [facility], 1 Device by Does not apply route every 14 (fourteen) days, Disp: 6 each, Rfl: 1    gabapentin, once-daily, (GRALISE) 300 MG tablet, Take 600 mg by mouth daily at bedtime , Disp: , Rfl:     insulin glargine (LANTUS SOLOSTAR) 100 units/mL injection pen, Lantus 9 units in the morning, Disp: 5 pen, Rfl: 0    lisinopril-hydrochlorothiazide (PRINZIDE,ZESTORETIC) 10-12 5 MG per tablet, Take 1 tablet by mouth daily, Disp: , Rfl:     metFORMIN (GLUCOPHAGE) 500 mg tablet, Take 1 tablet (500 mg total) by mouth daily, Disp: 90 tablet, Rfl: 0   pravastatin (PRAVACHOL) 80 mg tablet, Take 80 mg by mouth daily, Disp: , Rfl:     traZODone (DESYREL) 100 mg tablet, Take 100 mg by mouth daily at bedtime, Disp: , Rfl:     insulin aspart (NovoLOG) 100 Units/mL injection pen, Use scale at all meals as necessary (Patient not taking: Reported on 1/13/2021), Disp: 5 pen, Rfl: 0  Review of Systems   Constitutional: Negative for activity change, appetite change, fatigue and unexpected weight change  HENT: Negative for trouble swallowing  Eyes: Positive for visual disturbance (cataracts)  Respiratory: Negative for shortness of breath  Cardiovascular: Negative for chest pain and palpitations  Gastrointestinal: Positive for constipation (occasional)  Negative for diarrhea  Endocrine: Negative for polydipsia and polyuria  Musculoskeletal: Negative  Skin: Negative for wound  Neurological: Positive for numbness (occasional)  Psychiatric/Behavioral: Negative  Physical Exam:  Body mass index is 21 7 kg/m²  /88   Pulse 64   Temp 98 °F (36 7 °C) (Tympanic)   Ht 6' 2" (1 88 m)   Wt 76 7 kg (169 lb)   BMI 21 70 kg/m²    Wt Readings from Last 3 Encounters:   01/13/21 76 7 kg (169 lb)   01/04/21 75 7 kg (166 lb 12 8 oz)   10/06/20 76 2 kg (168 lb)       Physical Exam  Vitals signs and nursing note reviewed  Constitutional:       Appearance: He is well-developed  HENT:      Head: Normocephalic  Eyes:      General: No scleral icterus  Pupils: Pupils are equal, round, and reactive to light  Neck:      Musculoskeletal: Neck supple  Thyroid: No thyromegaly  Cardiovascular:      Rate and Rhythm: Normal rate and regular rhythm  Pulses:           Radial pulses are 2+ on the right side and 2+ on the left side  Heart sounds: No murmur  Pulmonary:      Effort: Pulmonary effort is normal  No respiratory distress  Breath sounds: Normal breath sounds  No wheezing  Skin:     General: Skin is warm and dry  Neurological:      Mental Status: He is alert  Patient's shoes and socks were not removed  Labs:   Component      Latest Ref Rng & Units 9/28/2020 1/6/2021   Sodium      136 - 145 mmol/L 139 139   Potassium      3 5 - 5 3 mmol/L 3 8 4 0   Chloride      100 - 108 mmol/L 106 104   CO2      21 - 32 mmol/L 28 29   Anion Gap      4 - 13 mmol/L 5 6   BUN      5 - 25 mg/dL 16 20   Creatinine      0 60 - 1 30 mg/dL 1 14 1 18   GLUCOSE FASTING      65 - 99 mg/dL 138 (H) 147 (H)   Calcium      8 3 - 10 1 mg/dL 9 3 9 3   eGFR      ml/min/1 73sq m 73 69   EXT Creatinine Urine      mg/dL  217 0   MICROALBUM ,U,RANDOM      0 0 - 20 0 mg/L  12 0   MICROALBUMIN/CREATININE RATIO      0 - 30 mg/g creatinine  6   Hemoglobin A1C      Normal 3 8-5 6%; PreDiabetic 5 7-6 4%; Diabetic >=6 5%; Glycemic control for adults with diabetes <7 0% % 6 5 (H) 6 5 (H)   eAG, EST AVG Glucose      mg/dl 140 140   C-PEPTIDE      1 1 - 4 4 ng/mL  1 1     Plan:    Diagnoses and all orders for this visit:    Type 1 diabetes mellitus with hyperglycemia ([facility] [address] )  HGA1C 6 5%  Remained the Same  Treatment regimen: continue current treatment  Advised patient to use scale at meals when necessary  Discussed intensive insulin regimen does increase risk for hypoglycemia  Episodes of hypoglycemia can lead to permanent disability and death  Discussed risks/complications associated with uncontrolled diabetes  Advised to adhere to diabetic diet, and recommended staying active/exercising routinely as tolerated  Keep carbohydrates consistent to limit blood glucose fluctuations  Advised to call if blood sugars less than 70 mg/dl or over 300 mg/dl  Check blood glucose 2 times a day  Discussed symptoms and treatment of hypoglycemia  Discussed use of CGM to collect additional blood glucose data to reveal trends and patterns that can be used to optimize treatment plan  Recommended routine follow-up with podiatry and ophthalmology     Send CGM report in 2 weeks  Ordered blood work to complete prior to next visit  -     Hemoglobin A1C; Future  -     Basic metabolic panel; Future    Essential hypertension  Blood pressure adequately controlled, continue current treatment  -     Basic metabolic panel; Future    Mixed hyperlipidemia  LDL 67  Continue statin therapy      Discussed with the patient diagnosis and treatment and all questions fully answered  He will call me if any problems arise  Counseled patient on diagnostic results, prognosis, risk and benefit of treatment options, instruction for management, importance of treatment compliance, risk factor reduction and impressions        Shaila Hdz PA-C

## 2021-01-13 NOTE — PATIENT INSTRUCTIONS
INSULIN DOSAGE INSTRUCTIONS    Name: Jany Tracy                        : 1945  MRN #: [id number]    Your Current Insulin  and dose is: Before Breakfast Before Lunch Before Evening Meal Bedtime     Novolog Insulin               Regular, Apidra, Humalog orNovolog Sliding Scale:   <80              151-200 +1  +1 +1    201-250 +2 +2 +2 +   251-300 +3 +3 +3 +   301-350 +4 +4 +4 +   >350 +5 +5 +5 +     Lantus Insulin 9 units         Additional Instructions:   Please test your blood sugar:  _4_ Times per day  X_ Before Breakfast                _ Alternate Testing  X_ Before Lunch                _ 2 Hours After  Meal  X_ Before Evening Meal               _ 3 a m   x_ Before Bedtime Snack     Target Blood sugar range _80_to _160__  Call if your blood sugar is less than _60_ or greater than _400__  Today's Date: 2021      Hypoglycemia instructions   Jany Tracy  [id number]    Low Blood Sugar    Steps to treat low blood sugar  1  Test blood sugar if you have symptoms of low blood sugar:   Low Blood Sugar Symptoms:  o Sweaty  o Dizzy  o Rapid heartbeat  o Shaky  o Bad mood  o Hungry      2  Treat blood sugar less than 70 with 15 grams of fast-acting carbohydrate:   Examples of 15 grams Fast-Acting Carbohydrate:  o 4 oz juice  o 4 oz regular soda  o 3-4 glucose tablets (chew)  o 3-4 hard candies (chew)          3  Wait 15 minutes and test your blood sugar again     4   If blood sugar is less than 100, repeat steps 2-3     5  When your blood sugar is 100 or more, eat a snack if it will be longer than one hour until your next meal  The snack should be 15 grams of carbohydrate and a protein:   Examples of snacks:  o ½ sandwich  o 6 crackers with cheese  o Piece of fruit with cheese or peanut butter  o 6 crackers with peanut butter

## 2021-01-27 ENCOUNTER — IMMUNIZATIONS (OUTPATIENT)
Dept: FAMILY MEDICINE CLINIC | Facility: HOSPITAL | Age: 76
End: 2021-01-27

## 2021-01-27 DIAGNOSIS — Z23 ENCOUNTER FOR IMMUNIZATION: Primary | ICD-10-CM

## 2021-01-27 PROCEDURE — 91301 SARS-COV-2 / COVID-19 MRNA VACCINE (MODERNA) 100 MCG: CPT

## 2021-01-27 PROCEDURE — 0011A SARS-COV-2 / COVID-19 MRNA VACCINE (MODERNA) 100 MCG: CPT

## 2021-02-22 ENCOUNTER — IMMUNIZATIONS (OUTPATIENT)
Dept: FAMILY MEDICINE CLINIC | Facility: HOSPITAL | Age: 76
End: 2021-02-22

## 2021-02-22 DIAGNOSIS — Z23 ENCOUNTER FOR IMMUNIZATION: Primary | ICD-10-CM

## 2021-02-22 PROCEDURE — 0012A SARS-COV-2 / COVID-19 MRNA VACCINE (MODERNA) 100 MCG: CPT

## 2021-02-22 PROCEDURE — 91301 SARS-COV-2 / COVID-19 MRNA VACCINE (MODERNA) 100 MCG: CPT

## 2021-03-15 DIAGNOSIS — Z79.4 TYPE 2 DIABETES MELLITUS WITH HYPERGLYCEMIA, WITH LONG-TERM CURRENT USE OF INSULIN (HCC): ICD-10-CM

## 2021-03-15 DIAGNOSIS — E11.65 TYPE 2 DIABETES MELLITUS WITH HYPERGLYCEMIA, WITH LONG-TERM CURRENT USE OF INSULIN (HCC): ICD-10-CM

## 2021-03-15 RX ORDER — FLASH GLUCOSE SENSOR
1 KIT MISCELLANEOUS
Qty: 6 EACH | Refills: 1 | Status: SHIPPED | OUTPATIENT
Start: 2021-03-15 | End: 2021-05-25 | Stop reason: SDUPTHER

## 2021-03-15 RX ORDER — FLASH GLUCOSE SENSOR
KIT MISCELLANEOUS
OUTPATIENT
Start: 2021-03-15

## 2021-03-22 ENCOUNTER — TELEPHONE (OUTPATIENT)
Dept: ENDOCRINOLOGY | Facility: CLINIC | Age: 76
End: 2021-03-22

## 2021-03-22 NOTE — TELEPHONE ENCOUNTER
bg log in media     meds-     novolog per paper 2 units in morning and 2 in evening then changed to the scale   lantus 9 units morning  Metformin 500mg 1 tablet daily

## 2021-05-12 ENCOUNTER — LAB (OUTPATIENT)
Dept: LAB | Facility: AMBULARY SURGERY CENTER | Age: 76
End: 2021-05-12
Payer: MEDICARE

## 2021-05-12 ENCOUNTER — TRANSCRIBE ORDERS (OUTPATIENT)
Dept: LAB | Facility: AMBULARY SURGERY CENTER | Age: 76
End: 2021-05-12

## 2021-05-12 DIAGNOSIS — R63.4 LOSS OF WEIGHT: ICD-10-CM

## 2021-05-12 DIAGNOSIS — E10.65 TYPE 1 DIABETES MELLITUS WITH HYPERGLYCEMIA (HCC): ICD-10-CM

## 2021-05-12 DIAGNOSIS — E10.42 TYPE 1 DIABETES MELLITUS WITH POLYNEUROPATHY (HCC): ICD-10-CM

## 2021-05-12 DIAGNOSIS — E10.42 TYPE 1 DIABETES MELLITUS WITH POLYNEUROPATHY (HCC): Primary | ICD-10-CM

## 2021-05-12 DIAGNOSIS — I10 ESSENTIAL HYPERTENSION: ICD-10-CM

## 2021-05-12 LAB
ALBUMIN SERPL BCP-MCNC: 3.8 G/DL (ref 3.5–5)
ALP SERPL-CCNC: 79 U/L (ref 46–116)
ALT SERPL W P-5'-P-CCNC: 15 U/L (ref 12–78)
ANION GAP SERPL CALCULATED.3IONS-SCNC: 4 MMOL/L (ref 4–13)
AST SERPL W P-5'-P-CCNC: 11 U/L (ref 5–45)
BILIRUB SERPL-MCNC: 1.47 MG/DL (ref 0.2–1)
BUN SERPL-MCNC: 15 MG/DL (ref 5–25)
CALCIUM SERPL-MCNC: 9.6 MG/DL (ref 8.3–10.1)
CHLORIDE SERPL-SCNC: 105 MMOL/L (ref 100–108)
CO2 SERPL-SCNC: 29 MMOL/L (ref 21–32)
CREAT SERPL-MCNC: 1.23 MG/DL (ref 0.6–1.3)
ERYTHROCYTE [DISTWIDTH] IN BLOOD BY AUTOMATED COUNT: 13.4 % (ref 11.6–15.1)
EST. AVERAGE GLUCOSE BLD GHB EST-MCNC: 148 MG/DL
GFR SERPL CREATININE-BSD FRML MDRD: 66 ML/MIN/1.73SQ M
GLUCOSE P FAST SERPL-MCNC: 191 MG/DL (ref 65–99)
HBA1C MFR BLD: 6.8 %
HCT VFR BLD AUTO: 41.1 % (ref 36.5–49.3)
HGB BLD-MCNC: 13.2 G/DL (ref 12–17)
MCH RBC QN AUTO: 29.9 PG (ref 26.8–34.3)
MCHC RBC AUTO-ENTMCNC: 32.1 G/DL (ref 31.4–37.4)
MCV RBC AUTO: 93 FL (ref 82–98)
PLATELET # BLD AUTO: 140 THOUSANDS/UL (ref 149–390)
PMV BLD AUTO: 13.5 FL (ref 8.9–12.7)
POTASSIUM SERPL-SCNC: 4.2 MMOL/L (ref 3.5–5.3)
PROT SERPL-MCNC: 7.1 G/DL (ref 6.4–8.2)
RBC # BLD AUTO: 4.42 MILLION/UL (ref 3.88–5.62)
SODIUM SERPL-SCNC: 138 MMOL/L (ref 136–145)
WBC # BLD AUTO: 4.73 THOUSAND/UL (ref 4.31–10.16)

## 2021-05-12 PROCEDURE — 85027 COMPLETE CBC AUTOMATED: CPT

## 2021-05-12 PROCEDURE — 36415 COLL VENOUS BLD VENIPUNCTURE: CPT

## 2021-05-12 PROCEDURE — 80053 COMPREHEN METABOLIC PANEL: CPT

## 2021-05-12 PROCEDURE — 83036 HEMOGLOBIN GLYCOSYLATED A1C: CPT

## 2021-05-19 ENCOUNTER — OFFICE VISIT (OUTPATIENT)
Dept: ENDOCRINOLOGY | Facility: CLINIC | Age: 76
End: 2021-05-19
Payer: MEDICARE

## 2021-05-19 VITALS
DIASTOLIC BLOOD PRESSURE: 68 MMHG | HEART RATE: 80 BPM | SYSTOLIC BLOOD PRESSURE: 130 MMHG | HEIGHT: 74 IN | BODY MASS INDEX: 20.92 KG/M2 | TEMPERATURE: 98.7 F | WEIGHT: 163 LBS

## 2021-05-19 DIAGNOSIS — Z79.4 TYPE 2 DIABETES MELLITUS WITH HYPERGLYCEMIA, WITH LONG-TERM CURRENT USE OF INSULIN (HCC): ICD-10-CM

## 2021-05-19 DIAGNOSIS — I10 ESSENTIAL HYPERTENSION: ICD-10-CM

## 2021-05-19 DIAGNOSIS — E78.2 MIXED HYPERLIPIDEMIA: ICD-10-CM

## 2021-05-19 DIAGNOSIS — E10.65 TYPE 1 DIABETES MELLITUS WITH HYPERGLYCEMIA (HCC): Primary | ICD-10-CM

## 2021-05-19 DIAGNOSIS — E11.65 TYPE 2 DIABETES MELLITUS WITH HYPERGLYCEMIA, WITH LONG-TERM CURRENT USE OF INSULIN (HCC): ICD-10-CM

## 2021-05-19 PROCEDURE — 99214 OFFICE O/P EST MOD 30 MIN: CPT | Performed by: PHYSICIAN ASSISTANT

## 2021-05-19 NOTE — PATIENT INSTRUCTIONS
Hypoglycemia instructions   Clementine Vuong  5/19/2021  [id number]    Low Blood Sugar    Steps to treat low blood sugar  1  Test blood sugar if you have symptoms of low blood sugar:   Low Blood Sugar Symptoms:  o Sweaty  o Dizzy  o Rapid heartbeat  o Shaky  o Bad mood  o Hungry      2  Treat blood sugar less than 70 with 15 grams of fast-acting carbohydrate:   Examples of 15 grams Fast-Acting Carbohydrate:  o 4 oz juice  o 4 oz regular soda  o 3-4 glucose tablets (chew)  o 3-4 hard candies (chew)          3  Wait 15 minutes and test your blood sugar again     4   If blood sugar is less than 100, repeat steps 2-3     5  When your blood sugar is 100 or more, eat a snack if it will be longer than one hour until your next meal  The snack should be 15 grams of carbohydrate and a protein:   Examples of snacks:  o ½ sandwich  o 6 crackers with cheese  o Piece of fruit with cheese or peanut butter  o 6 crackers with peanut butter

## 2021-05-19 NOTE — PROGRESS NOTES
Patient Progress Note      CC: DM      Referring Provider  Pari Pino Md  [address] [place] [address],  [address] [place] [address]     History of Present Illness:   Kassi Guillermo is a 76 y o  male with a history of type 1 diabetes with long term use of insulin  His antibodies are normal but C-peptide is low  Diabetes course has been stable  Complications of DM: CKD  Denies recent illness or hospitalizations  Denies any issues with his current regimen  Home glucose monitoring: are performed regularly  Use Freestyle Rosalinda  Average glucose 150 mg/dl with variability of 27 2%  In target range 81%, above range 19%, below range 0%  Current regimen: Lantus 9 units daily, Novolog scale only at meals PRN, metformin 500 mg daily  compliant most of the time, denies any side effects from medications  Injects in: thigh  Rotates sites: Yes  Hypoglycemic episodes: No, rare  H/o of hypoglycemia causing hospitalization or Intervention such as glucagon injection or ambulance call : Yes  Hypoglycemia symptoms: tired, hungry, sweaty  Treatment of hypoglycemia: discussed treatment      Medic alert tag: recommended: Yes     Diabetes education: Yes  Diet: 3 meals per day, 2 snacks per day  Timing of meals is predictable  Diabetic diet compliance: compliant most of the time  Activity: Daily activity is predictable: Yes  He tries to walk daily     Ophthamology: goes to [facility], Dec 2020  Podiatry: goes 3 times a year; [facility]      Has hypertension: on ACE inhibitor/ARB, compliant most of the time  Has hyperlipidemia: on statin - tolerating well, no myalgias  compliant most of the time, denies any side effects from medications    Thyroid disorders: No  History of pancreatitis: No    Patient Active Problem List   Diagnosis    LBBB (left bundle branch block)    Essential hypertension    Pulmonary nodule    Type 1 diabetes mellitus with hyperglycemia ([facility] [address] )    Mixed hyperlipidemia      Past Medical History: Diagnosis Date    Benign essential hypertension     Diabetes mellitus (HCC)     Hyperlipidemia     LBBB (left bundle branch block)       History reviewed  No pertinent surgical history     Family History   Problem Relation Age of Onset    Diabetes unspecified Father     Diabetes unspecified Brother      Social History     Tobacco Use    Smoking status: Former Smoker     Types: Cigarettes     Quit date:      Years since quittin 4    Smokeless tobacco: Never Used   Substance Use Topics    Alcohol use: Not Currently     No Known Allergies      Current Outpatient Medications:     amLODIPine (NORVASC) 5 mg tablet, Take 5 mg by mouth daily , Disp: , Rfl:     aspirin 81 MG tablet, Take 1 tablet by mouth daily, Disp: , Rfl:     brimonidine tartrate 0 2 % ophthalmic solution, , Disp: , Rfl:     Cholecalciferol 2000 units CAPS, Take 2,000 Units by mouth every other day , Disp: , Rfl:     citalopram (CeleXA) 40 mg tablet, Take 20 mg by mouth daily , Disp: , Rfl:     Continuous Blood Gluc Sensor (EnergateStyle Rosalinda 14 Day Sensor) MISC, Use 1 Device every 14 (fourteen) days, Disp: 6 each, Rfl: 1    gabapentin, once-daily, (GRALISE) 300 MG tablet, Take 600 mg by mouth daily at bedtime , Disp: , Rfl:     insulin aspart (NovoLOG) 100 Units/mL injection pen, Use scale at all meals as necessary, Disp: 5 pen, Rfl: 0    insulin glargine (LANTUS SOLOSTAR) 100 units/mL injection pen, Lantus 9 units in the morning, Disp: 5 pen, Rfl: 0    lisinopril-hydrochlorothiazide (PRINZIDE,ZESTORETIC) 10-12 5 MG per tablet, Take 1 tablet by mouth daily, Disp: , Rfl:     metFORMIN (GLUCOPHAGE) 500 mg tablet, Take 1 tablet (500 mg total) by mouth daily, Disp: 90 tablet, Rfl: 0    pravastatin (PRAVACHOL) 80 mg tablet, Take 80 mg by mouth daily, Disp: , Rfl:     traZODone (DESYREL) 100 mg tablet, Take 100 mg by mouth daily at bedtime, Disp: , Rfl:     busPIRone (BUSPAR) 5 mg tablet, Take 5 mg by mouth 2 (two) times a day, Disp: , Rfl:   Review of Systems   Constitutional: Negative for activity change, appetite change, fatigue and unexpected weight change  HENT: Negative for trouble swallowing  Eyes: Negative for visual disturbance  Respiratory: Negative for shortness of breath  Cardiovascular: Negative for chest pain and palpitations  Gastrointestinal: Positive for constipation (occasional)  Negative for diarrhea  Endocrine: Positive for polydipsia (slight increase)  Negative for polyuria  Musculoskeletal: Negative  Skin: Negative for wound  Neurological: Negative for numbness  Psychiatric/Behavioral: Negative  Physical Exam:  Body mass index is 20 93 kg/m²  /68   Pulse 80   Temp 98 7 °F (37 1 °C) (Tympanic)   Ht 6' 2" (1 88 m)   Wt 73 9 kg (163 lb)   BMI 20 93 kg/m²    Wt Readings from Last 3 Encounters:   05/19/21 73 9 kg (163 lb)   01/13/21 76 7 kg (169 lb)   01/04/21 75 7 kg (166 lb 12 8 oz)       Physical Exam  Vitals signs and nursing note reviewed  Constitutional:       Appearance: He is well-developed  HENT:      Head: Normocephalic  Eyes:      General: No scleral icterus  Pupils: Pupils are equal, round, and reactive to light  Neck:      Musculoskeletal: Neck supple  Thyroid: No thyromegaly  Cardiovascular:      Rate and Rhythm: Normal rate and regular rhythm  Pulses:           Radial pulses are 2+ on the right side and 2+ on the left side  Heart sounds: No murmur  Pulmonary:      Effort: Pulmonary effort is normal  No respiratory distress  Breath sounds: Normal breath sounds  No wheezing  Skin:     General: Skin is warm and dry  Neurological:      Mental Status: He is alert  Patient's shoes and socks were not removed        Labs:   Component      Latest Ref Rng & Units 1/6/2021 5/12/2021   Sodium      136 - 145 mmol/L 139 138   Potassium      3 5 - 5 3 mmol/L 4 0 4 2   Chloride      100 - 108 mmol/L 104 105   CO2      21 - 32 mmol/L 29 29 Anion Gap      4 - 13 mmol/L 6 4   BUN      5 - 25 mg/dL 20 15   Creatinine      0 60 - 1 30 mg/dL 1 18 1 23   GLUCOSE FASTING      65 - 99 mg/dL 147 (H) 191 (H)   Calcium      8 3 - 10 1 mg/dL 9 3 9 6   AST      5 - 45 U/L  11   ALT      12 - 78 U/L  15   Alkaline Phosphatase      46 - 116 U/L  79   Total Protein      6 4 - 8 2 g/dL  7 1   Albumin      3 5 - 5 0 g/dL  3 8   TOTAL BILIRUBIN      0 20 - 1 00 mg/dL  1 47 (H)   eGFR      ml/min/1 73sq m 69 66   EXT Creatinine Urine      mg/dL 217 0    MICROALBUM ,U,RANDOM      0 0 - 20 0 mg/L 12 0    MICROALBUMIN/CREATININE RATIO      0 - 30 mg/g creatinine 6    Hemoglobin A1C      Normal 3 8-5 6%; PreDiabetic 5 7-6 4%; Diabetic >=6 5%; Glycemic control for adults with diabetes <7 0% % 6 5 (H) 6 8 (H)   eAG, EST AVG Glucose      mg/dl 140 148   C-PEPTIDE      1 1 - 4 4 ng/mL 1 1      Plan:    Diagnoses and all orders for this visit:    Type 1 diabetes mellitus with hyperglycemia ([facility] [address] )  HGA1C 6 8%  Improved  Treatment regimen: continue current treatment  Discussed intensive insulin regimen does increase risk for hypoglycemia  Episodes of hypoglycemia can lead to permanent disability and death  Discussed risks/complications associated with uncontrolled diabetes  Advised to adhere to diabetic diet, and recommended staying active/exercising routinely as tolerated  Keep carbohydrates consistent to limit blood glucose fluctuations  Advised to call if blood sugars less than 70 mg/dl or over 300 mg/dl  Check blood glucose 3+ times a day  Discussed symptoms and treatment of hypoglycemia  Discussed use of CGM to collect additional blood glucose data to reveal trends and patterns that can be used to optimize treatment plan  Recommended routine follow-up with podiatry and ophthalmology  Ordered blood work to complete prior to next visit  -     Hemoglobin A1C; Future  -     Basic metabolic panel;  Future    Essential hypertension  Blood pressure adequately controlled, continue current treatment     Mixed hyperlipidemia  Continue statin therapy   Managed by PCP       Discussed with the patient diagnosis and treatment and all questions fully answered  He will call me if any problems arise  Counseled patient on diagnostic results, prognosis, risk and benefit of treatment options, instruction for management, importance of treatment compliance, risk factor reduction and impressions        Shaila Hdz PA-C

## 2021-05-25 DIAGNOSIS — Z79.4 TYPE 2 DIABETES MELLITUS WITH HYPERGLYCEMIA, WITH LONG-TERM CURRENT USE OF INSULIN (HCC): ICD-10-CM

## 2021-05-25 DIAGNOSIS — E11.65 TYPE 2 DIABETES MELLITUS WITH HYPERGLYCEMIA, WITH LONG-TERM CURRENT USE OF INSULIN (HCC): ICD-10-CM

## 2021-05-25 RX ORDER — FLASH GLUCOSE SENSOR
1 KIT MISCELLANEOUS
Qty: 6 EACH | Refills: 1 | Status: SHIPPED | OUTPATIENT
Start: 2021-05-25 | End: 2021-10-26 | Stop reason: SDUPTHER

## 2021-06-03 ENCOUNTER — PREPPED CHART (OUTPATIENT)
Dept: URBAN - METROPOLITAN AREA CLINIC 6 | Facility: CLINIC | Age: 76
End: 2021-06-03

## 2021-07-26 ENCOUNTER — OFFICE VISIT (OUTPATIENT)
Dept: DIABETES SERVICES | Facility: CLINIC | Age: 76
End: 2021-07-26
Payer: MEDICARE

## 2021-07-26 VITALS — BODY MASS INDEX: 21.07 KG/M2 | WEIGHT: 164.2 LBS | HEIGHT: 74 IN

## 2021-07-26 DIAGNOSIS — E11.65 TYPE 2 DIABETES MELLITUS WITH HYPERGLYCEMIA, WITH LONG-TERM CURRENT USE OF INSULIN (HCC): Primary | ICD-10-CM

## 2021-07-26 DIAGNOSIS — Z79.4 TYPE 2 DIABETES MELLITUS WITH HYPERGLYCEMIA, WITH LONG-TERM CURRENT USE OF INSULIN (HCC): Primary | ICD-10-CM

## 2021-07-26 PROCEDURE — 97803 MED NUTRITION INDIV SUBSEQ: CPT | Performed by: DIETITIAN, REGISTERED

## 2021-07-26 NOTE — PROGRESS NOTES
Medical Nutrition Therapy      Assessment    Chief complaint Type 2 Diabetes Mellitus    Visit Type: Follow-up visit    HPI: Jude Walton returned for follow-up today  Most recent HbA1c has increased to 6 8% from previously 6 5%  Jude Walton uses Rosalinda CGM but does not have reader with him to download today and also did not bring a BG log  Jude Walton was often inconsistent as a historian today in regard to foods at his meals, portions, as well as when and hof often he has been taking his Novolog which is sliding scale only  He states that his wife quickly wrote out a 3 day food record for him to bring to the appointment today  However, after discussion with Jude Anton it is questionable how reliable and accurate the record is  For example, 2 of the days have the only carbohydrate at lunch listed as cantaloupe, and Jude Walton states that he always has a sandwich at lunch as well  There are also examples of 2 oz or 4 oz portions of foods listed, then Jude Walton will describe amounts of those foods that are more than double in size  Ibrahimas food record reveals inconsistent carbohydrate intake and excess saturated fat intake  Overall, Ibrahimas meals per food record range 15-85  Grams of carbohydrate  Based on meal plan review, food record, and discussion with Elissakatelynn Anton provided education about keeping carbohydrate intake to 45-60 grams per meal  Reviewed to use the Portion Book to held determine carbohydrate amounts  Also reviewed to look at total carbohydrate when using nutrition labels rather than sugars  Jude Walton questions where he is supposed to be injection insulin  Provided review of appropriate injection sites as well importance of site rotation  Jude Walton verbalized understanding of topics and recommendations discussed today and will call with any questions or to schedule additional diabetes education       Ht Readings from Last 1 Encounters:   05/19/21 6' 2" (1 88 m)     Wt Readings from Last 2 Encounters:   05/19/21 73 9 kg (163 lb)   01/13/21 76 7 kg (169 lb)     Weight Change: No    Medical Diagnosis/reason for visit E11 65, Z79 4 (ICD-10-CM) - Type 2 diabetes mellitus with hyperglycemia, with long-term current use of insulin     Food Log:   Please see scanned 3 day food diary      Calorie needs 2,000 kcals/day Carbs: 45-60 g/meal, 15 g/snack     Fat: 5 servings/day    Protein:10 oz/day    Nutrition Diagnosis:  Inconsistent carbohydrate intake  intake related to Food and nutrition related knowledge deficit concerning appropriate amount and timing of carbohydrate intake as evidenced by  Estimated carbohydrate intake that is different from recommended types or ingested on an irregular basis    Intervention: reduced fat intake, label reading, carbohydrate counting and meal planning     Treatment Goals: Patient understands education and recommendations, Patient will monitor portion control and Patient will count carbohydrates    Monitoring and evaluation:    Term code indicator  FH 1 6 3 Carbohydrate Intake Criteria: 45-60 g of carbohydrate per meal, 15 g of carbohydrate per snack  Term code indicator  FH 4 4 Mealtime Behavior Criteria: 3 meals per day, 4-5 hours apart, Up to 3 snacks per day, at least 2 hours apart from meals  Patients Response to Instruction:  Aristides Hansen  Expected Compliancefair    Thank you for coming to the [facility] for education today  Please feel free to call with any questions or concerns      Start: 9:37 am  Stop: 10:07 am  Referred by: MD Kayy Leung, [address] [place] [address] Baltazar Porter  [address] [place] [phone]

## 2021-08-06 ENCOUNTER — HOSPITAL ENCOUNTER (OUTPATIENT)
Dept: RADIOLOGY | Age: 76
Discharge: HOME | End: 2021-08-06
Payer: MEDICARE

## 2021-08-06 DIAGNOSIS — R63.4 ABNORMAL WEIGHT LOSS: ICD-10-CM

## 2021-08-06 PROCEDURE — 74177 CT ABD & PELVIS W/CONTRAST: CPT

## 2021-08-06 PROCEDURE — G1004 CDSM NDSC: HCPCS

## 2021-08-06 PROCEDURE — 71260 CT THORAX DX C+: CPT

## 2021-08-06 RX ADMIN — IOHEXOL 100 ML: 350 INJECTION, SOLUTION INTRAVENOUS at 08:59

## 2021-09-13 ENCOUNTER — TELEPHONE (OUTPATIENT)
Dept: ENDOCRINOLOGY | Facility: CLINIC | Age: 76
End: 2021-09-13

## 2021-09-13 NOTE — TELEPHONE ENCOUNTER
bg log     meds  novolog using scale at meals   lantus 9 units  Metformin one tablet in morning 500mg

## 2021-09-13 NOTE — TELEPHONE ENCOUNTER
BG levels mostly in acceptable range  Bring log to upcoming appointment  Continue current treatment for now

## 2021-09-17 ENCOUNTER — APPOINTMENT (OUTPATIENT)
Dept: LAB | Facility: AMBULARY SURGERY CENTER | Age: 76
End: 2021-09-17
Payer: MEDICARE

## 2021-09-17 DIAGNOSIS — E10.65 TYPE 1 DIABETES MELLITUS WITH HYPERGLYCEMIA (HCC): ICD-10-CM

## 2021-09-17 LAB
ANION GAP SERPL CALCULATED.3IONS-SCNC: 3 MMOL/L (ref 4–13)
BUN SERPL-MCNC: 19 MG/DL (ref 5–25)
CALCIUM SERPL-MCNC: 9.2 MG/DL (ref 8.3–10.1)
CHLORIDE SERPL-SCNC: 104 MMOL/L (ref 100–108)
CO2 SERPL-SCNC: 29 MMOL/L (ref 21–32)
CREAT SERPL-MCNC: 1.16 MG/DL (ref 0.6–1.3)
EST. AVERAGE GLUCOSE BLD GHB EST-MCNC: 160 MG/DL
GFR SERPL CREATININE-BSD FRML MDRD: 71 ML/MIN/1.73SQ M
GLUCOSE P FAST SERPL-MCNC: 148 MG/DL (ref 65–99)
HBA1C MFR BLD: 7.2 %
POTASSIUM SERPL-SCNC: 4.3 MMOL/L (ref 3.5–5.3)
SODIUM SERPL-SCNC: 136 MMOL/L (ref 136–145)

## 2021-09-17 PROCEDURE — 83036 HEMOGLOBIN GLYCOSYLATED A1C: CPT

## 2021-09-17 PROCEDURE — 80048 BASIC METABOLIC PNL TOTAL CA: CPT

## 2021-09-17 PROCEDURE — 36415 COLL VENOUS BLD VENIPUNCTURE: CPT

## 2021-09-22 ENCOUNTER — OFFICE VISIT (OUTPATIENT)
Dept: ENDOCRINOLOGY | Facility: CLINIC | Age: 76
End: 2021-09-22
Payer: MEDICARE

## 2021-09-22 VITALS
HEIGHT: 74 IN | WEIGHT: 164 LBS | BODY MASS INDEX: 21.05 KG/M2 | HEART RATE: 64 BPM | DIASTOLIC BLOOD PRESSURE: 70 MMHG | SYSTOLIC BLOOD PRESSURE: 128 MMHG | TEMPERATURE: 98.4 F

## 2021-09-22 DIAGNOSIS — I10 ESSENTIAL HYPERTENSION: ICD-10-CM

## 2021-09-22 DIAGNOSIS — E78.2 MIXED HYPERLIPIDEMIA: ICD-10-CM

## 2021-09-22 DIAGNOSIS — E10.65 TYPE 1 DIABETES MELLITUS WITH HYPERGLYCEMIA (HCC): Primary | ICD-10-CM

## 2021-09-22 DIAGNOSIS — Z79.4 TYPE 2 DIABETES MELLITUS WITH HYPERGLYCEMIA, WITH LONG-TERM CURRENT USE OF INSULIN (HCC): ICD-10-CM

## 2021-09-22 DIAGNOSIS — E11.65 TYPE 2 DIABETES MELLITUS WITH HYPERGLYCEMIA, WITH LONG-TERM CURRENT USE OF INSULIN (HCC): ICD-10-CM

## 2021-09-22 PROCEDURE — 95251 CONT GLUC MNTR ANALYSIS I&R: CPT | Performed by: INTERNAL MEDICINE

## 2021-09-22 PROCEDURE — 99214 OFFICE O/P EST MOD 30 MIN: CPT | Performed by: INTERNAL MEDICINE

## 2021-09-22 NOTE — PROGRESS NOTES
Eric Briones 76 y o  male MRN: [id number]    Encounter: [id number]      Assessment/Plan     Assessment: This is a 76y o -year-old male with diabetes with hyperglycemia  Plan:    Diagnoses and all orders for this visit:    Type 1 diabetes mellitus with hyperglycemia ([facility] [address] )   patient has usually elevated blood sugars after breakfast, after lunch and after dinner  He takes 1-2 units of NovoLog as per scale  discussed with patient to increase Lantus to 10 units in the morning as blood sugars elevated during the day  And take NovoLog insulin sliding scale as below with each meal    continue metformin 500 mg daily   discussed to download the sensor in 2 weeks for review to make further adjustment  Discussed to keep carbohydrate consistent with meals to avoid fluctuation in blood sugars  discussed hypoglycemia symptoms and treatment  discussed to follow-up with Podiatry and Ophthalmology regularly  -     insulin aspart (NovoLOG) 100 Units/mL injection pen; Use scale ,  Scale - 150-200 -1 units, 201-250-2 units, 251-300 -3 units, 301-350-4 units, > 350- 5 units at all meals as necessary  -     Hemoglobin A1C; Future  -     Basic metabolic panel; Future  -     Microalbumin / creatinine urine ratio; Future    Essential hypertension  BP Readings from Last 3 Encounters:   09/22/21 128/70   05/19/21 130/68   01/13/21 136/88    blood pressure well controlled  Continue current management as per PCP  patient is currently on amlodipine 5 mg daily  Mixed hyperlipidemia   currently on Pravachol 80 mg daily, managed by PCP   order lipid profile before next appointment  -     Lipid panel Lab Collect Lab Collect;  Future    Type 2 diabetes mellitus with hyperglycemia, with long-term current use of insulin (HCC)   increase-     insulin glargine (LANTUS SOLOSTAR) 100 units/mL injection pen; Lantus 10 units in the morning        CC: Diabetes    History of Present Illness     HPI:    Eric Briones  Is 42-year-old gentleman with medical history of type 1 diabetes managed on insulin regimen, Lantus and NovoLog, hypertension and hyperlipidemia is here for follow-up  he takes Lantus 9 units in the morning  Takes NovoLog with meals as necessary,   for vitamin-D deficiency, he takes vitamin-D3,  2000 International Units  Every other day  uses continues glucose monitor sensor by [employer]  Continues glucose monitor sensor report from September 9, 2021 to September 22, 2021 reviewed,% time C GM  His active is 95%  Average glucose is 167 mg/dL, GM eyes 7 3%  Glucose variability is 24 7%   blood sugars in target range 70 to 1 80 mg/dL, 63%   blood sugars low, 0%   blood sugars very low less than 54 mg/dL, 0% high blood sugars 180-250 mg/dL, 34%   very high blood sugars more than 250 mg/dL-3%     for hypertension he takes lisinopril hydrochlorothiazide, 1 tablet daily  He takes Norvasc 5 mg daily  for hyperlipidemia, he takes Pravachol 80 mg daily/    Lab Results   Component Value Date    HGBA1C 7 2 (H) 09/17/2021     Results for Ena Pete (MRN [id number]) as of 9/22/2021 12:02   Ref  Range 9/17/2021 09:19   Sodium Latest Ref Range: 136 - 145 mmol/L 136   Potassium Latest Ref Range: 3 5 - 5 3 mmol/L 4 3   Chloride Latest Ref Range: 100 - 108 mmol/L 104   CO2 Latest Ref Range: 21 - 32 mmol/L 29   Anion Gap Latest Ref Range: 4 - 13 mmol/L 3 (L)   BUN Latest Ref Range: 5 - 25 mg/dL 19   Creatinine Latest Ref Range: 0 60 - 1 30 mg/dL 1 16   GLUCOSE FASTING Latest Ref Range: 65 - 99 mg/dL 148 (H)   Calcium Latest Ref Range: 8 3 - 10 1 mg/dL 9 2   eGFR Latest Units: ml/min/1 73sq m 71     Results for Ena Pete (MRN [id number]) as of 9/22/2021 12:02   Ref  Range 9/17/2021 09:19   Hemoglobin A1C Latest Ref Range: Normal 3 8-5 6%; PreDiabetic 5 7-6 4%;  Diabetic >=6 5%; Glycemic control for adults with diabetes <7 0% % 7 2 (H)        Review of Systems   Constitutional: Negative for activity change, diaphoresis, fatigue, fever and unexpected weight change  HENT: Negative  Eyes: Negative for visual disturbance  Respiratory: Negative for cough, chest tightness and shortness of breath  Cardiovascular: Negative for chest pain, palpitations and leg swelling  Gastrointestinal: Negative for abdominal pain, blood in stool, constipation, diarrhea, nausea and vomiting  Endocrine: Negative for cold intolerance, heat intolerance, polydipsia, polyphagia and polyuria  Genitourinary: Negative for dysuria, enuresis, frequency and urgency  Musculoskeletal: Negative for arthralgias and myalgias  Skin: Negative for pallor, rash and wound  Allergic/Immunologic: Negative  Neurological: Negative for dizziness, tremors, weakness and numbness  Hematological: Negative  Psychiatric/Behavioral: Negative  Historical Information   Past Medical History:   Diagnosis Date    Benign essential hypertension     Diabetes mellitus (HCC)     Hyperlipidemia     LBBB (left bundle branch block)      History reviewed  No pertinent surgical history    Social History   Social History     Substance and Sexual Activity   Alcohol Use Not Currently     Social History     Substance and Sexual Activity   Drug Use Not Currently     Social History     Tobacco Use   Smoking Status Former Smoker    Types: Cigarettes    Quit date: 56    Years since quittin 7   Smokeless Tobacco Never Used     Family History:   Family History   Problem Relation Age of Onset    Diabetes unspecified Father     Diabetes unspecified Brother        Meds/Allergies   Current Outpatient Medications   Medication Sig Dispense Refill    amLODIPine (NORVASC) 5 mg tablet Take 5 mg by mouth daily       aspirin 81 MG tablet Take 1 tablet by mouth daily      brimonidine tartrate 0 2 % ophthalmic solution       busPIRone (BUSPAR) 5 mg tablet Take 5 mg by mouth 2 (two) times a day      Cholecalciferol 2000 units CAPS Take 2,000 Units by mouth every other day       citalopram (CeleXA) 40 mg tablet Take 20 mg by mouth daily       Continuous Blood Gluc Sensor (FreeStyle Rosalinda 14 Day Sensor) MISC Use 1 Device every 14 (fourteen) days 6 each 1    gabapentin, once-daily, (GRALISE) 300 MG tablet Take 600 mg by mouth daily at bedtime       insulin aspart (NovoLOG) 100 Units/mL injection pen Use scale ,  Scale - 150-200 -1 units, 201-250-2 units, 251-300 -3 units, 301-350-4 units, > 350- 5 units at all meals as necessary      insulin glargine (LANTUS SOLOSTAR) 100 units/mL injection pen Lantus 10 units in the morning      lisinopril-hydrochlorothiazide (PRINZIDE,ZESTORETIC) 10-12 5 MG per tablet Take 1 tablet by mouth daily      metFORMIN (GLUCOPHAGE) 500 mg tablet Take 1 tablet (500 mg total) by mouth daily 90 tablet 0    pravastatin (PRAVACHOL) 80 mg tablet Take 80 mg by mouth daily      traZODone (DESYREL) 100 mg tablet Take 100 mg by mouth daily at bedtime       No current facility-administered medications for this visit  No Known Allergies    Objective   Vitals: Blood pressure 128/70, pulse 64, temperature 98 4 °F (36 9 °C), temperature source Tympanic, height 6' 2" (1 88 m), weight 74 4 kg (164 lb)  Physical Exam  Vitals reviewed  Constitutional:       General: He is not in acute distress  Appearance: Normal appearance  He is well-developed  HENT:      Head: Normocephalic and atraumatic  Eyes:      Pupils: Pupils are equal, round, and reactive to light  Neck:      Thyroid: No thyromegaly  Cardiovascular:      Rate and Rhythm: Normal rate and regular rhythm  Pulses: Normal pulses  Heart sounds: Normal heart sounds  Pulmonary:      Effort: Pulmonary effort is normal  No respiratory distress  Breath sounds: Normal breath sounds  Musculoskeletal:         General: No deformity  Normal range of motion  Cervical back: Normal range of motion and neck supple  Skin:     General: Skin is warm and dry  Findings: No erythema  Neurological:      General: No focal deficit present  Mental Status: He is alert and oriented to person, place, and time  Psychiatric:         Mood and Affect: Mood normal          Behavior: Behavior normal          The history was obtained from the review of the chart, patient  Lab Results:   Lab Results   Component Value Date/Time    Hemoglobin A1C 7 2 (H) 09/17/2021 09:19 AM    Hemoglobin A1C 6 8 (H) 05/12/2021 09:31 AM    Hemoglobin A1C 6 5 (H) 01/06/2021 09:32 AM    WBC 4 73 05/12/2021 09:31 AM    Hemoglobin 13 2 05/12/2021 09:31 AM    Hematocrit 41 1 05/12/2021 09:31 AM    MCV 93 05/12/2021 09:31 AM    Platelets 603 (L) 94/75/0432 09:31 AM    BUN 19 09/17/2021 09:19 AM    BUN 15 05/12/2021 09:31 AM    BUN 20 01/06/2021 09:32 AM    Potassium 4 3 09/17/2021 09:19 AM    Potassium 4 2 05/12/2021 09:31 AM    Potassium 4 0 01/06/2021 09:32 AM    Chloride 104 09/17/2021 09:19 AM    Chloride 105 05/12/2021 09:31 AM    Chloride 104 01/06/2021 09:32 AM    CO2 29 09/17/2021 09:19 AM    CO2 29 05/12/2021 09:31 AM    CO2 29 01/06/2021 09:32 AM    Creatinine 1 16 09/17/2021 09:19 AM    Creatinine 1 23 05/12/2021 09:31 AM    Creatinine 1 18 01/06/2021 09:32 AM    AST 11 05/12/2021 09:31 AM    ALT 15 05/12/2021 09:31 AM    Albumin 3 8 05/12/2021 09:31 AM           Imaging Studies: I have personally reviewed pertinent reports  Portions of the record may have been created with voice recognition software  Occasional wrong word or "sound a like" substitutions may have occurred due to the inherent limitations of voice recognition software  Read the chart carefully and recognize, using context, where substitutions have occurred

## 2021-09-22 NOTE — PATIENT INSTRUCTIONS
Hypoglycemia instructions   Angelica Menezes  9/22/2021  [id number]    Low Blood Sugar    Steps to treat low blood sugar  1  Test blood sugar if you have symptoms of low blood sugar:   Low Blood Sugar Symptoms:  o Sweaty  o Dizzy  o Rapid heartbeat  o Shaky  o Bad mood  o Hungry      2  Treat blood sugar less than 70 with 15 grams of fast-acting carbohydrate:   Examples of 15 grams Fast-Acting Carbohydrate:  o 4 oz juice  o 4 oz regular soda  o 3-4 glucose tablets (chew)  o 3-4 hard candies (chew)          3  Wait 15 minutes and test your blood sugar again     4   If blood sugar is less than 100, repeat steps 2-3     5  When your blood sugar is 100 or more, eat a snack if it will be longer than one hour until your next meal  The snack should be 15 grams of carbohydrate and a protein:   Examples of snacks:  o ½ sandwich  o 6 crackers with cheese  o Piece of fruit with cheese or peanut butter  o 6 crackers with peanut butter

## 2021-10-01 DIAGNOSIS — E10.65 TYPE 1 DIABETES MELLITUS WITH HYPERGLYCEMIA (HCC): Primary | ICD-10-CM

## 2021-10-04 ENCOUNTER — FOLLOW UP (OUTPATIENT)
Dept: URBAN - METROPOLITAN AREA CLINIC 6 | Facility: CLINIC | Age: 76
End: 2021-10-04

## 2021-10-04 DIAGNOSIS — E11.9: ICD-10-CM

## 2021-10-04 DIAGNOSIS — H25.812: ICD-10-CM

## 2021-10-04 DIAGNOSIS — H40.1132: ICD-10-CM

## 2021-10-04 PROCEDURE — 3072F LOW RISK FOR RETINOPATHY: CPT

## 2021-10-04 PROCEDURE — 92014 COMPRE OPH EXAM EST PT 1/>: CPT

## 2021-10-04 PROCEDURE — G8427 DOCREV CUR MEDS BY ELIG CLIN: HCPCS

## 2021-10-04 PROCEDURE — 92133 CPTRZD OPH DX IMG PST SGM ON: CPT

## 2021-10-04 PROCEDURE — 2022F DILAT RTA XM EVC RTNOPTHY: CPT

## 2021-10-04 PROCEDURE — 1036F TOBACCO NON-USER: CPT

## 2021-10-04 PROCEDURE — 92202 OPSCPY EXTND ON/MAC DRAW: CPT

## 2021-10-04 ASSESSMENT — TONOMETRY
OS_IOP_MMHG: 18
OD_IOP_MMHG: 17

## 2021-10-04 ASSESSMENT — VISUAL ACUITY
OD_CC: 20/30
OS_CC: 20/30-1
OS_CC: J1
OD_CC: J1

## 2021-10-26 DIAGNOSIS — Z79.4 TYPE 2 DIABETES MELLITUS WITH HYPERGLYCEMIA, WITH LONG-TERM CURRENT USE OF INSULIN (HCC): ICD-10-CM

## 2021-10-26 DIAGNOSIS — E11.65 TYPE 2 DIABETES MELLITUS WITH HYPERGLYCEMIA, WITH LONG-TERM CURRENT USE OF INSULIN (HCC): ICD-10-CM

## 2021-10-26 RX ORDER — FLASH GLUCOSE SENSOR
1 KIT MISCELLANEOUS
Qty: 6 EACH | Refills: 1 | Status: SHIPPED | OUTPATIENT
Start: 2021-10-26 | End: 2022-03-31 | Stop reason: SDUPTHER

## 2021-10-29 ENCOUNTER — IMMUNIZATIONS (OUTPATIENT)
Dept: FAMILY MEDICINE CLINIC | Facility: HOSPITAL | Age: 76
End: 2021-10-29

## 2021-10-29 DIAGNOSIS — Z23 ENCOUNTER FOR IMMUNIZATION: Primary | ICD-10-CM

## 2022-01-21 ENCOUNTER — LAB (OUTPATIENT)
Dept: LAB | Facility: AMBULARY SURGERY CENTER | Age: 77
End: 2022-01-21
Payer: MEDICARE

## 2022-01-21 DIAGNOSIS — E10.65 TYPE 1 DIABETES MELLITUS WITH HYPERGLYCEMIA (HCC): ICD-10-CM

## 2022-01-21 DIAGNOSIS — E78.2 MIXED HYPERLIPIDEMIA: ICD-10-CM

## 2022-01-21 LAB
ANION GAP SERPL CALCULATED.3IONS-SCNC: 3 MMOL/L (ref 4–13)
BUN SERPL-MCNC: 18 MG/DL (ref 5–25)
CALCIUM SERPL-MCNC: 9.3 MG/DL (ref 8.3–10.1)
CHLORIDE SERPL-SCNC: 105 MMOL/L (ref 100–108)
CHOLEST SERPL-MCNC: 152 MG/DL
CO2 SERPL-SCNC: 31 MMOL/L (ref 21–32)
CREAT SERPL-MCNC: 1.2 MG/DL (ref 0.6–1.3)
CREAT UR-MCNC: 200 MG/DL
EST. AVERAGE GLUCOSE BLD GHB EST-MCNC: 151 MG/DL
GFR SERPL CREATININE-BSD FRML MDRD: 58 ML/MIN/1.73SQ M
GLUCOSE P FAST SERPL-MCNC: 138 MG/DL (ref 65–99)
HBA1C MFR BLD: 6.9 %
HDLC SERPL-MCNC: 58 MG/DL
LDLC SERPL CALC-MCNC: 81 MG/DL (ref 0–100)
MICROALBUMIN UR-MCNC: 18.1 MG/L (ref 0–20)
MICROALBUMIN/CREAT 24H UR: 9 MG/G CREATININE (ref 0–30)
NONHDLC SERPL-MCNC: 94 MG/DL
POTASSIUM SERPL-SCNC: 4.1 MMOL/L (ref 3.5–5.3)
SODIUM SERPL-SCNC: 139 MMOL/L (ref 136–145)
TRIGL SERPL-MCNC: 64 MG/DL

## 2022-01-21 PROCEDURE — 36415 COLL VENOUS BLD VENIPUNCTURE: CPT

## 2022-01-21 PROCEDURE — 80061 LIPID PANEL: CPT

## 2022-01-21 PROCEDURE — 82570 ASSAY OF URINE CREATININE: CPT

## 2022-01-21 PROCEDURE — 83036 HEMOGLOBIN GLYCOSYLATED A1C: CPT

## 2022-01-21 PROCEDURE — 82043 UR ALBUMIN QUANTITATIVE: CPT

## 2022-01-21 PROCEDURE — 80048 BASIC METABOLIC PNL TOTAL CA: CPT

## 2022-01-26 ENCOUNTER — OFFICE VISIT (OUTPATIENT)
Dept: ENDOCRINOLOGY | Facility: CLINIC | Age: 77
End: 2022-01-26
Payer: MEDICARE

## 2022-01-26 VITALS
HEART RATE: 68 BPM | WEIGHT: 170 LBS | SYSTOLIC BLOOD PRESSURE: 138 MMHG | TEMPERATURE: 97.5 F | HEIGHT: 74 IN | BODY MASS INDEX: 21.82 KG/M2 | DIASTOLIC BLOOD PRESSURE: 80 MMHG

## 2022-01-26 DIAGNOSIS — I10 ESSENTIAL HYPERTENSION: ICD-10-CM

## 2022-01-26 DIAGNOSIS — E78.2 MIXED HYPERLIPIDEMIA: ICD-10-CM

## 2022-01-26 DIAGNOSIS — E10.65 TYPE 1 DIABETES MELLITUS WITH HYPERGLYCEMIA (HCC): Primary | ICD-10-CM

## 2022-01-26 PROBLEM — N18.31 STAGE 3A CHRONIC KIDNEY DISEASE (HCC): Status: ACTIVE | Noted: 2022-01-26

## 2022-01-26 PROCEDURE — 95251 CONT GLUC MNTR ANALYSIS I&R: CPT | Performed by: PHYSICIAN ASSISTANT

## 2022-01-26 PROCEDURE — 99214 OFFICE O/P EST MOD 30 MIN: CPT | Performed by: PHYSICIAN ASSISTANT

## 2022-01-26 NOTE — PATIENT INSTRUCTIONS
INSULIN DOSAGE INSTRUCTIONS    Name: Salma Ayala                        : 1945  MRN #: [id number]    Your Current Insulin  and dose is: Before Breakfast Before Lunch Before Evening Meal Bedtime     Novolog Insulin   Scale only     Scale only   Scale only    Regular, Apidra, Humalog orNovolog Sliding Scale:   <80              151-200 +1  +1 +1    201-250 +2 +2 +2 +   251-300 +3 +3 +3 +   301-350 +4 +4 +4 +   >350 +5 +5 +5 +     Lantus Insulin 10 units        Additional Instructions:   Please test your blood sugar:  _4_ Times per day  X_ Before Breakfast                _ Alternate Testing  X_ Before Lunch                _ 2 Hours After  Meal  X_ Before Evening Meal               _ 3 a m   x_ Before Bedtime Snack     Target Blood sugar range _80_to _160__  Call if your blood sugar is less than _60_ or greater than _400__  Today's Date: 2022      Hypoglycemia instructions   Salma Ayala  2022  [id number]    Low Blood Sugar    Steps to treat low blood sugar  1  Test blood sugar if you have symptoms of low blood sugar:   Low Blood Sugar Symptoms:  o Sweaty  o Dizzy  o Rapid heartbeat  o Shaky  o Bad mood  o Hungry      2  Treat blood sugar less than 70 with 15 grams of fast-acting carbohydrate:   Examples of 15 grams Fast-Acting Carbohydrate:  o 4 oz juice  o 4 oz regular soda  o 3-4 glucose tablets (chew)  o 3-4 hard candies (chew)          3  Wait 15 minutes and test your blood sugar again     4   If blood sugar is less than 100, repeat steps 2-3     5  When your blood sugar is 100 or more, eat a snack if it will be longer than one hour until your next meal  The snack should be 15 grams of carbohydrate and a protein:   Examples of snacks:  o ½ sandwich  o 6 crackers with cheese  o Piece of fruit with cheese or peanut butter  o 6 crackers with peanut butter

## 2022-01-26 NOTE — PROGRESS NOTES
Patient Progress Note      CC: DM      Referring Provider  No referring provider defined for this encounter  History of Present Illness:   Manuela Degroot is a 68 y o  male with a history of type 1 diabetes with long term use of insulin  His antibodies are normal but C-peptide is low  Diabetes course has been stable  Complications of DM: CKD  Denies recent illness or hospitalizations  Denies any issues with his current regimen  Home glucose monitoring: are performed regularly  Use Freestyle Rosalinda  Average glucose 145 mg/dl with variability of 32 6%  In target range 76%, above range 23%, below range 1%  Current regimen: Lantus 10 units daily, Novolog scale only at meals PRN, metformin 500 mg daily  compliant most of the time, denies any side effects from medications  Injects in: thigh, abdomen  Rotates sites: Yes  Hypoglycemic episodes: No, rare  H/o of hypoglycemia causing hospitalization or Intervention such as glucagon injection or ambulance call : Yes  Hypoglycemia symptoms: dizzy / unsteady  Treatment of hypoglycemia: discussed treatment      Medic alert tag: recommended: Yes     Diabetes education: Yes  Diet: 3 meals per day, 2 snacks per day  Timing of meals is predictable  Diabetic diet compliance: compliant some of the time  Activity: Daily activity is predictable: Yes  No routine exercise     Ophthamology: goes to [facility], thinks he is due to go next month  Podiatry: goes 3 times a year; [facility]      Has hypertension: on ACE inhibitor/ARB, compliant most of the time  Has hyperlipidemia: on statin - tolerating well, no myalgias  compliant most of the time, denies any side effects from medications    Thyroid disorders: No  History of pancreatitis: No    Patient Active Problem List   Diagnosis    LBBB (left bundle branch block)    Essential hypertension    Pulmonary nodule    Type 1 diabetes mellitus with hyperglycemia (HCC)    Mixed hyperlipidemia    Stage 3a chronic kidney disease [facility])      Past Medical History:   Diagnosis Date    Benign essential hypertension     Diabetes mellitus (HCC)     Hyperlipidemia     LBBB (left bundle branch block)       History reviewed  No pertinent surgical history     Family History   Problem Relation Age of Onset    Diabetes unspecified Father     Diabetes unspecified Brother      Social History     Tobacco Use    Smoking status: Former Smoker     Types: Cigarettes     Quit date:      Years since quittin 1    Smokeless tobacco: Never Used   Substance Use Topics    Alcohol use: Not Currently     No Known Allergies      Current Outpatient Medications:     amLODIPine (NORVASC) 5 mg tablet, Take 5 mg by mouth daily , Disp: , Rfl:     aspirin 81 MG tablet, Take 1 tablet by mouth daily, Disp: , Rfl:     brimonidine tartrate 0 2 % ophthalmic solution, , Disp: , Rfl:     busPIRone (BUSPAR) 5 mg tablet, Take 5 mg by mouth 2 (two) times a day, Disp: , Rfl:     Cholecalciferol 2000 units CAPS, Take 2,000 Units by mouth every other day , Disp: , Rfl:     citalopram (CeleXA) 40 mg tablet, Take 20 mg by mouth daily , Disp: , Rfl:     Continuous Blood Gluc Sensor (FreeStyle Rosalinda 14 Day Sensor) MISC, Use 1 Device every 14 (fourteen) days, Disp: 6 each, Rfl: 1    gabapentin, once-daily, (GRALISE) 300 MG tablet, Take 600 mg by mouth daily at bedtime , Disp: , Rfl:     insulin aspart (NovoLOG) 100 Units/mL injection pen, Use scale ,  Scale - 150-200 -1 units, 201-250-2 units, 251-300 -3 units, 301-350-4 units, > 350- 5 units at all meals as necessary, Disp: , Rfl:     insulin glargine (LANTUS SOLOSTAR) 100 units/mL injection pen, Lantus 10 units in the morning, Disp: , Rfl:     Insulin Pen Needle 31G X 8 MM MISC, Use 4 (four) times a day, Disp: 400 each, Rfl: 1    lisinopril-hydrochlorothiazide (PRINZIDE,ZESTORETIC) 10-12 5 MG per tablet, Take 1 tablet by mouth daily, Disp: , Rfl:     metFORMIN (GLUCOPHAGE) 500 mg tablet, Take 1 tablet (500 mg total) by mouth daily, Disp: 90 tablet, Rfl: 0    pravastatin (PRAVACHOL) 80 mg tablet, Take 80 mg by mouth daily, Disp: , Rfl:     traZODone (DESYREL) 100 mg tablet, Take 100 mg by mouth daily at bedtime, Disp: , Rfl:   Review of Systems   Constitutional: Negative for activity change, appetite change, fatigue and unexpected weight change  HENT: Negative for trouble swallowing  Eyes: Negative for visual disturbance  Respiratory: Negative for shortness of breath  Cardiovascular: Negative for chest pain and palpitations  Gastrointestinal: Positive for constipation  Negative for diarrhea  Endocrine: Negative for polydipsia and polyuria  Musculoskeletal: Negative  Skin: Negative for wound  Neurological: Negative for numbness  Tingling in feet sometimes   Psychiatric/Behavioral: Negative  Physical Exam:  Body mass index is 21 83 kg/m²  /80   Pulse 68   Temp 97 5 °F (36 4 °C) (Tympanic)   Ht 6' 2" (1 88 m)   Wt 77 1 kg (170 lb)   BMI 21 83 kg/m²    Wt Readings from Last 3 Encounters:   01/26/22 77 1 kg (170 lb)   09/22/21 74 4 kg (164 lb)   07/26/21 74 5 kg (164 lb 3 2 oz)       Physical Exam  Vitals and nursing note reviewed  Constitutional:       Appearance: He is well-developed  HENT:      Head: Normocephalic  Eyes:      General: No scleral icterus  Pupils: Pupils are equal, round, and reactive to light  Neck:      Thyroid: No thyromegaly  Cardiovascular:      Rate and Rhythm: Normal rate and regular rhythm  Pulses:           Radial pulses are 2+ on the right side and 2+ on the left side  Heart sounds: No murmur heard  Pulmonary:      Effort: Pulmonary effort is normal  No respiratory distress  Breath sounds: Normal breath sounds  No wheezing  Musculoskeletal:      Cervical back: Neck supple  Skin:     General: Skin is warm and dry  Neurological:      Mental Status: He is alert         Patient's shoes and socks were not removed  Labs:   Component      Latest Ref Rng & Units 9/17/2021 1/21/2022   Sodium      136 - 145 mmol/L 136 139   Potassium      3 5 - 5 3 mmol/L 4 3 4 1   Chloride      100 - 108 mmol/L 104 105   CO2      21 - 32 mmol/L 29 31   Anion Gap      4 - 13 mmol/L 3 (L) 3 (L)   BUN      5 - 25 mg/dL 19 18   Creatinine      0 60 - 1 30 mg/dL 1 16 1 20   GLUCOSE FASTING      65 - 99 mg/dL 148 (H) 138 (H)   Calcium      8 3 - 10 1 mg/dL 9 2 9 3   eGFR      ml/min/1 73sq m 71 58   Cholesterol      See Comment mg/dL  152   Triglycerides      See Comment mg/dL  64   HDL      >=40 mg/dL  58   LDL Calculated      0 - 100 mg/dL  81   Non-HDL Cholesterol      mg/dl  94   EXT Creatinine Urine      mg/dL  200 0   MICROALBUM ,U,RANDOM      0 0 - 20 0 mg/L  18 1   MICROALBUMIN/CREATININE RATIO      0 - 30 mg/g creatinine  9   Hemoglobin A1C      Normal 3 8-5 6%; PreDiabetic 5 7-6 4%; Diabetic >=6 5%; Glycemic control for adults with diabetes <7 0% % 7 2 (H) 6 9 (H)   eAG, EST AVG Glucose      mg/dl 160 151       Plan:    Diagnoses and all orders for this visit:    Type 1 diabetes mellitus with hyperglycemia ([facility] [address] )  HGA1C 6 9%  Improved  Treatment regimen: continue current treatment   Discussed intensive insulin regimen does increase risk for hypoglycemia  Episodes of hypoglycemia can lead to permanent disability and death  Discussed risks/complications associated with uncontrolled diabetes  Advised to adhere to diabetic diet, and recommended staying active/exercising routinely as tolerated  Keep carbohydrates consistent to limit blood glucose fluctuations  Advised to call if blood sugars less than 70 mg/dl or over 300 mg/dl  Check blood glucose 3+ times a day  Discussed symptoms and treatment of hypoglycemia  Discussed use of CGM to collect additional blood glucose data to reveal trends and patterns that can be used to optimize treatment plan  Recommended routine follow-up with podiatry and ophthalmology  Ordered blood work to complete prior to next visit  -     Hemoglobin A1C; Future  -     Basic metabolic panel; Future    Essential hypertension  Blood pressure adequately controlled, continue current treatment   -     Basic metabolic panel; Future    Mixed hyperlipidemia  LDL 81  Continue statin therapy         Discussed with the patient diagnosis and treatment and all questions fully answered  He will call me if any problems arise  Counseled patient on diagnostic results, prognosis, risk and benefit of treatment options, instruction for management, importance of treatment compliance, risk factor reduction and impressions        Shaila Hdz PA-C

## 2022-03-11 ENCOUNTER — OFFICE VISIT (OUTPATIENT)
Dept: CARDIAC SURGERY | Facility: CLINIC | Age: 77
End: 2022-03-11
Payer: MEDICARE

## 2022-03-11 VITALS
BODY MASS INDEX: 20.53 KG/M2 | DIASTOLIC BLOOD PRESSURE: 71 MMHG | OXYGEN SATURATION: 100 % | HEART RATE: 61 BPM | RESPIRATION RATE: 18 BRPM | SYSTOLIC BLOOD PRESSURE: 148 MMHG | HEIGHT: 74 IN | WEIGHT: 160 LBS

## 2022-03-11 DIAGNOSIS — I44.7 LBBB (LEFT BUNDLE BRANCH BLOCK): Primary | ICD-10-CM

## 2022-03-11 DIAGNOSIS — I10 ESSENTIAL HYPERTENSION: ICD-10-CM

## 2022-03-11 DIAGNOSIS — E78.2 MIXED HYPERLIPIDEMIA: ICD-10-CM

## 2022-03-11 PROCEDURE — 93000 ELECTROCARDIOGRAM COMPLETE: CPT | Performed by: INTERNAL MEDICINE

## 2022-03-11 PROCEDURE — 99213 OFFICE O/P EST LOW 20 MIN: CPT | Performed by: INTERNAL MEDICINE

## 2022-03-11 RX ORDER — AMMONIUM LACTATE 12 G/100G
LOTION TOPICAL
COMMUNITY
Start: 2021-12-08

## 2022-03-11 NOTE — PROGRESS NOTES
Cardiology Follow Up Visit    Greg Mendes  1945  [id number]  [facility]  [facility] [address]  [phone]  [phone]    1  LBBB (left bundle branch block)  POCT ECG   2  Essential hypertension  POCT ECG   3  Mixed hyperlipidemia  POCT ECG         Discussion/Summary:    1  Essential htn, controlled  2  Chronic lbbb  3  Hyperlipidemia, on pravastatin, stable    Plan:  Patient overall doing well  May no changes changes in medical regimen  Continue daily activity/low-salt sensible diet  Blood pressure well controlled    Interval History:    79-year-old male originally seen for essential hypertension left bundle-branch block    Underwent coronary CTA due to claustrophobia from stress testing  This showed no evidence of any significant atherosclerosis with a calcium score of 3      He has done well since    He is asymptomatic    Blood pressures been well controlled    Chronic left bundle-branch block, no prior syncope or other advanced heart block    Patient Active Problem List   Diagnosis    LBBB (left bundle branch block)    Essential hypertension    Pulmonary nodule    Type 1 diabetes mellitus with hyperglycemia (HCC)    Mixed hyperlipidemia    Stage 3a chronic kidney disease ([facility] [address] )     Past Medical History:   Diagnosis Date    Benign essential hypertension     Diabetes mellitus (HCC)     Hyperlipidemia     LBBB (left bundle branch block)      Social History     Socioeconomic History    Marital status: /Civil Union     Spouse name: Not on file    Number of children: Not on file    Years of education: Not on file    Highest education level: Not on file   Occupational History    Not on file   Tobacco Use    Smoking status: Former Smoker     Types: Cigarettes     Quit date:      Years since quittin 2    Smokeless tobacco: Never Used   Substance and Sexual Activity    Alcohol use: Not Currently    Drug use: Not Currently    Sexual activity: Not on file   Other Topics Concern    Not on file   Social History Narrative    Not on file     Social Determinants of Health     Financial Resource Strain: Not on file   Food Insecurity: Not on file   Transportation Needs: Not on file   Physical Activity: Not on file   Stress: Not on file   Social Connections: Not on file   Intimate Partner Violence: Not on file   Housing Stability: Not on file      Family History   Problem Relation Age of Onset    Diabetes unspecified Father     Diabetes unspecified Brother      History reviewed  No pertinent surgical history  Current Outpatient Medications:     amLODIPine (NORVASC) 5 mg tablet, Take 5 mg by mouth daily , Disp: , Rfl:     ammonium lactate (LAC-HYDRIN) 12 % lotion, , Disp: , Rfl:     aspirin 81 MG tablet, Take 1 tablet by mouth daily, Disp: , Rfl:     brimonidine tartrate 0 2 % ophthalmic solution, , Disp: , Rfl:     busPIRone (BUSPAR) 5 mg tablet, Take 5 mg by mouth 2 (two) times a day, Disp: , Rfl:     Cholecalciferol 2000 units CAPS, Take 2,000 Units by mouth every other day , Disp: , Rfl:     citalopram (CeleXA) 40 mg tablet, Take 20 mg by mouth daily , Disp: , Rfl:     Continuous Blood Gluc Sensor (FreeStyle Rosalinda 14 Day Sensor) MISC, Use 1 Device every 14 (fourteen) days, Disp: 6 each, Rfl: 1    cyanocobalamin (VITAMIN B-12) 500 MCG tablet, Vitamin B-12 TABS TAKE 1 TABLET DAILY     Refills: 0     Active, Disp: , Rfl:     gabapentin, once-daily, (GRALISE) 300 MG tablet, Take 600 mg by mouth daily at bedtime , Disp: , Rfl:     insulin aspart (NovoLOG) 100 Units/mL injection pen, Use scale ,  Scale - 150-200 -1 units, 201-250-2 units, 251-300 -3 units, 301-350-4 units, > 350- 5 units at all meals as necessary, Disp: , Rfl:     insulin glargine (LANTUS SOLOSTAR) 100 units/mL injection pen, Lantus 10 units in the morning, Disp: , Rfl:     Insulin Pen Needle 31G X 8 MM MISC, Use 4 (four) times a day, Disp: 400 each, Rfl: 1    lisinopril-hydrochlorothiazide (PRINZIDE,ZESTORETIC) 10-12 5 MG per tablet, Take 1 tablet by mouth daily, Disp: , Rfl:     metFORMIN (GLUCOPHAGE) 500 mg tablet, Take 1 tablet (500 mg total) by mouth daily, Disp: 90 tablet, Rfl: 0    pravastatin (PRAVACHOL) 80 mg tablet, Take 80 mg by mouth daily, Disp: , Rfl:     traZODone (DESYREL) 100 mg tablet, Take 100 mg by mouth daily at bedtime, Disp: , Rfl:   No Known Allergies      Social, Family, Medication history reviewed and updated as necessary      Labs:     Lab Results   Component Value Date     05/01/2015    K 4 1 01/21/2022     01/21/2022    CO2 31 01/21/2022    BUN 18 01/21/2022    CREATININE 1 20 01/21/2022    CREATININE 1 16 09/17/2021    GLUCOSE 115 05/01/2015    CALCIUM 9 3 01/21/2022       Lab Results   Component Value Date    WBC 4 73 05/12/2021    HGB 13 2 05/12/2021    HGB 15 3 05/01/2015    HCT 41 1 05/12/2021    HCT 45 1 05/01/2015     (L) 05/12/2021     05/01/2015       No results found for: CHOL  Lab Results   Component Value Date    HDL 58 01/21/2022     Lab Results   Component Value Date    LDLCALC 81 01/21/2022     No results found for: LDLDIRECT          Lab Results   Component Value Date    TRIG 64 01/21/2022       Lab Results   Component Value Date    ALT 15 05/12/2021    AST 11 05/12/2021       Lab Results   Component Value Date    INR 0 98 05/01/2015       No results found for: NTBNP    Lab Results   Component Value Date    HGBA1C 6 9 (H) 01/21/2022    HGBA1C 7 2 (H) 09/17/2021    HGBA1C 6 8 (H) 05/12/2021           Imaging: Reviewed in epic        Review of Systems:  14 systems reviewed and negative with exception of the above        PHYSICAL EXAM:      Vitals:    03/11/22 0912   BP: 148/71   Pulse: 61   Resp: 18   SpO2: 100%     Body mass index is 20 54 kg/m²     Weight (last 2 days)     Date/Time Weight    03/11/22 0912 72 6 (160)            Gen: No acute distress  HEENT: anicteric, mucous membranes moist  Neck: supple, no jugular venous distention, or carotid bruit  Heart: regular, normal s1 and s2, no murmur/rub or gallop  Lungs :clear to auscultation bilaterally, no rales/rhonchi or wheeze  Abdomen: soft nontender, normoactive bowel sounds, no organomegaly  Ext: warm and perfused, normal femoral pulses, no edema, or clubbing  Skin: warm, no rashes  Neuro: AAO x 3, no focal findings  Psychiatric: normal affect  Musculoskeletal: no obvious joint deformities  This note was completed in part utilizing [employer] direct voice recognition software  Grammatical errors, random word insertion, spelling mistakes, and incomplete sentences may be an occasional consequence of the system secondary to software limitations, ambient noise and hardware issues  At the time of dictation, efforts were made to edit, clarify and /or correct errors  Please read the chart carefully and recognize, using context, where substitutions have occurred  If you have any questions or concerns about the context, text or information contained within the body of this dictation, please contact myself, the provider, for further clarification

## 2022-03-30 ENCOUNTER — FOLLOW UP (OUTPATIENT)
Dept: URBAN - METROPOLITAN AREA CLINIC 6 | Facility: CLINIC | Age: 77
End: 2022-03-30

## 2022-03-30 DIAGNOSIS — H40.1132: ICD-10-CM

## 2022-03-30 DIAGNOSIS — H25.812: ICD-10-CM

## 2022-03-30 DIAGNOSIS — Z96.1: ICD-10-CM

## 2022-03-30 PROCEDURE — 92020 GONIOSCOPY: CPT

## 2022-03-30 PROCEDURE — 92083 EXTENDED VISUAL FIELD XM: CPT

## 2022-03-30 PROCEDURE — 92012 INTRM OPH EXAM EST PATIENT: CPT

## 2022-03-30 ASSESSMENT — VISUAL ACUITY
OU_CC: J1
OD_CC: 20/30
OS_CC: 20/30

## 2022-03-30 ASSESSMENT — TONOMETRY
OD_IOP_MMHG: 17
OS_IOP_MMHG: 16

## 2022-03-31 DIAGNOSIS — Z79.4 TYPE 2 DIABETES MELLITUS WITH HYPERGLYCEMIA, WITH LONG-TERM CURRENT USE OF INSULIN (HCC): ICD-10-CM

## 2022-03-31 DIAGNOSIS — E11.65 TYPE 2 DIABETES MELLITUS WITH HYPERGLYCEMIA, WITH LONG-TERM CURRENT USE OF INSULIN (HCC): ICD-10-CM

## 2022-03-31 RX ORDER — FLASH GLUCOSE SENSOR
1 KIT MISCELLANEOUS
Qty: 6 EACH | Refills: 1 | Status: SHIPPED | OUTPATIENT
Start: 2022-03-31 | End: 2022-06-15 | Stop reason: SDUPTHER

## 2022-04-08 ENCOUNTER — IMMUNIZATIONS (OUTPATIENT)
Dept: FAMILY MEDICINE CLINIC | Facility: HOSPITAL | Age: 77
End: 2022-04-08

## 2022-04-08 DIAGNOSIS — Z23 ENCOUNTER FOR IMMUNIZATION: Primary | ICD-10-CM

## 2022-04-08 PROCEDURE — 0064A COVID-19 MODERNA VACC 0.25 ML BOOSTER: CPT

## 2022-04-08 PROCEDURE — 91306 COVID-19 MODERNA VACC 0.25 ML BOOSTER: CPT

## 2022-05-09 ENCOUNTER — TELEPHONE (OUTPATIENT)
Dept: ENDOCRINOLOGY | Facility: CLINIC | Age: 77
End: 2022-05-09

## 2022-05-25 ENCOUNTER — LAB (OUTPATIENT)
Dept: LAB | Facility: AMBULARY SURGERY CENTER | Age: 77
End: 2022-05-25
Payer: MEDICARE

## 2022-05-25 DIAGNOSIS — I10 ESSENTIAL HYPERTENSION: ICD-10-CM

## 2022-05-25 DIAGNOSIS — E10.65 TYPE 1 DIABETES MELLITUS WITH HYPERGLYCEMIA (HCC): ICD-10-CM

## 2022-05-25 LAB
ANION GAP SERPL CALCULATED.3IONS-SCNC: 0 MMOL/L (ref 4–13)
BUN SERPL-MCNC: 12 MG/DL (ref 5–25)
CALCIUM SERPL-MCNC: 9.5 MG/DL (ref 8.3–10.1)
CHLORIDE SERPL-SCNC: 103 MMOL/L (ref 100–108)
CO2 SERPL-SCNC: 32 MMOL/L (ref 21–32)
CREAT SERPL-MCNC: 1.08 MG/DL (ref 0.6–1.3)
EST. AVERAGE GLUCOSE BLD GHB EST-MCNC: 154 MG/DL
GFR SERPL CREATININE-BSD FRML MDRD: 66 ML/MIN/1.73SQ M
GLUCOSE P FAST SERPL-MCNC: 226 MG/DL (ref 65–99)
HBA1C MFR BLD: 7 %
POTASSIUM SERPL-SCNC: 4.2 MMOL/L (ref 3.5–5.3)
SODIUM SERPL-SCNC: 135 MMOL/L (ref 136–145)

## 2022-05-25 PROCEDURE — 80048 BASIC METABOLIC PNL TOTAL CA: CPT

## 2022-05-25 PROCEDURE — 83036 HEMOGLOBIN GLYCOSYLATED A1C: CPT

## 2022-05-25 PROCEDURE — 36415 COLL VENOUS BLD VENIPUNCTURE: CPT

## 2022-06-02 ENCOUNTER — OFFICE VISIT (OUTPATIENT)
Dept: ENDOCRINOLOGY | Facility: CLINIC | Age: 77
End: 2022-06-02
Payer: MEDICARE

## 2022-06-02 VITALS
BODY MASS INDEX: 20.16 KG/M2 | HEART RATE: 67 BPM | DIASTOLIC BLOOD PRESSURE: 64 MMHG | TEMPERATURE: 96.5 F | WEIGHT: 157 LBS | SYSTOLIC BLOOD PRESSURE: 122 MMHG

## 2022-06-02 DIAGNOSIS — I10 ESSENTIAL HYPERTENSION: ICD-10-CM

## 2022-06-02 DIAGNOSIS — E10.65 TYPE 1 DIABETES MELLITUS WITH HYPERGLYCEMIA (HCC): ICD-10-CM

## 2022-06-02 DIAGNOSIS — E78.2 MIXED HYPERLIPIDEMIA: ICD-10-CM

## 2022-06-02 DIAGNOSIS — E11.65 TYPE 2 DIABETES MELLITUS WITH HYPERGLYCEMIA, WITH LONG-TERM CURRENT USE OF INSULIN (HCC): Primary | ICD-10-CM

## 2022-06-02 DIAGNOSIS — Z79.4 TYPE 2 DIABETES MELLITUS WITH HYPERGLYCEMIA, WITH LONG-TERM CURRENT USE OF INSULIN (HCC): Primary | ICD-10-CM

## 2022-06-02 DIAGNOSIS — Z79.4 TYPE 2 DIABETES MELLITUS WITH HYPERGLYCEMIA, WITH LONG-TERM CURRENT USE OF INSULIN (HCC): ICD-10-CM

## 2022-06-02 DIAGNOSIS — E11.65 TYPE 2 DIABETES MELLITUS WITH HYPERGLYCEMIA, WITH LONG-TERM CURRENT USE OF INSULIN (HCC): ICD-10-CM

## 2022-06-02 PROCEDURE — 99214 OFFICE O/P EST MOD 30 MIN: CPT | Performed by: INTERNAL MEDICINE

## 2022-06-02 PROCEDURE — 95251 CONT GLUC MNTR ANALYSIS I&R: CPT | Performed by: INTERNAL MEDICINE

## 2022-06-02 NOTE — PROGRESS NOTES
Domenico Clark 68 y o  male MRN: [id number]    Encounter: [id number]      Assessment/Plan     Assessment: This is a 68y o -year-old male with diabetes with hyperglycemia  Plan:  Diagnoses and all orders for this visit:    Type 2 diabetes mellitus with hyperglycemia, with long-term current use of insulin (HCC)    A1c is at goal, patient is having hypoglycemia usually after lunch, after dinner  Discussed to reduce Novolog, only as per scale,Use scale , Scale - 150-200 -1 units, 201-250-2 units, 251-300 -3 units, 301-350-4 units, > 350- 5 units at all meals as necessary,   Increase Lantus to 12 units in the morning  Discussed to check blood sugars 4-6 times daily, download Rosalinda sensor again in 2-3 weeks  Discussed to call if blood sugar persistently low less than 80 and more than 250 mg/dL  Care plan-his blood sugars are consistently lower than 100 mg/dL range, reduce Lantus by 20%, if blood sugars are consistently elevated above 250 mg/dL range, increase Lantus by 20%  NovoLog can be adjusted, as necessary, by starting fixed dose up to 1-3 units with meals, plus scale, if blood sugars are persistently elevated about 250 mg/dL after meals  -     insulin glargine (LANTUS SOLOSTAR) 100 units/mL injection pen; Lantus 12  units in the morning  -     Basic metabolic panel; Future  -     Hemoglobin A1C; Future  -     Microalbumin / creatinine urine ratio; Future    Mixed hyperlipidemia  Continue statins  LDL goal is less than 100    Essential hypertension  Continue current management as per PCP  Blood pressure well controlled      CC: Diabetes    History of Present Illness     HPI:    Domenico Clark is 68 yrear old gentleman with medical history of type 1 diabetes with long-term use of insulin , is here for follow-up  He has history of low C-peptide  Antibodies are negative  Diabetes course has been stable  Complications of diabetes are CKD/he denies any recent hospitalization or illnesses    He denies hyperglycemia or hypoglycemia causing hospitalization  Currently he takes Lantus 10 units in the morning, NovoLog as per scale p r n , metformin 500 mg daily  Patient stated that his blood sugars usually drops after meals when he takes NovoLog  Discussed not to take NovoLog if blood sugar is less than 150 with meals  Uses continues glucose monitor sensor all the time which is helping him with blood sugar monitoring as well as for prevention of hypoglycemia and hyperglycemia  May 20 to June 2, 2022, 14 days over review  53% blood sugars are in target range,  mg/dL  1% blood sugars are low 54-69 mg/dL  0% blood sugars are very low less than 54 mg/dL  40% blood sugars are high 181-250 mg/dL  6% blood sugars are very high more than 250 mg/dL  Patient has pattern of low blood sugar around 12:00 p m  As well as sometimes around 6:00 p m  And at 12:00 a m  For hypertension, he takes Norvasc 5 mg daily, lisinopril/hydrochlorothiazide 10/12 5 mg daily  For hyperlipidemia, he takes Pravachol 80 mg daily  He takes vitamin-D 3 supplementation 2000 International Units daily      He needs to make appointment with Ophthalmology, December 2022  Diabetic foot exam is up-to-date    Lab Results   Component Value Date    HGBA1C 7 0 (H) 05/25/2022     Lab Results   Component Value Date    CREATININE 1 08 05/25/2022         Review of Systems   Constitutional: Positive for activity change and fatigue  Negative for diaphoresis, fever and unexpected weight change  HENT: Positive for hearing loss  Eyes: Negative for visual disturbance  Respiratory: Negative for cough, chest tightness and shortness of breath  Cardiovascular: Negative for chest pain, palpitations and leg swelling  Gastrointestinal: Negative for abdominal pain, blood in stool, constipation, diarrhea, nausea and vomiting  Endocrine: Negative for cold intolerance, heat intolerance, polydipsia, polyphagia and polyuria     Genitourinary: Positive for frequency and urgency  Negative for dysuria and enuresis  Musculoskeletal: Negative for arthralgias and myalgias  Skin: Negative for pallor, rash and wound  Allergic/Immunologic: Negative  Neurological: Negative for dizziness, tremors, weakness and numbness  Hematological: Negative  Psychiatric/Behavioral: Negative  Historical Information   Past Medical History:   Diagnosis Date    Benign essential hypertension     Diabetes mellitus (HCC)     Hyperlipidemia     LBBB (left bundle branch block)      History reviewed  No pertinent surgical history  Social History   Social History     Substance and Sexual Activity   Alcohol Use Not Currently     Social History     Substance and Sexual Activity   Drug Use Not Currently     Social History     Tobacco Use   Smoking Status Former Smoker    Types: Cigarettes    Quit date: 56    Years since quittin 4   Smokeless Tobacco Never Used     Family History:   Family History   Problem Relation Age of Onset    Diabetes unspecified Father     Diabetes unspecified Brother        Meds/Allergies   Current Outpatient Medications   Medication Sig Dispense Refill    amLODIPine (NORVASC) 5 mg tablet Take 5 mg by mouth daily       ammonium lactate (LAC-HYDRIN) 12 % lotion       aspirin 81 MG tablet Take 1 tablet by mouth daily      brimonidine tartrate 0 2 % ophthalmic solution       busPIRone (BUSPAR) 5 mg tablet Take 5 mg by mouth 2 (two) times a day      Cholecalciferol 2000 units CAPS Take 2,000 Units by mouth every other day       citalopram (CeleXA) 40 mg tablet Take 20 mg by mouth daily       Continuous Blood Gluc Sensor (FreeStyle Rosalinda 14 Day Sensor) MISC Use 1 Device every 14 (fourteen) days 6 each 1    cyanocobalamin (VITAMIN B-12) 500 MCG tablet Vitamin B-12 TABS  TAKE 1 TABLET DAILY      Refills: 0       Active      gabapentin, once-daily, (GRALISE) 300 MG tablet Take 600 mg by mouth daily at bedtime       insulin aspart (NovoLOG) 100 Units/mL injection pen Use scale ,  Scale - 150-200 -1 units, 201-250-2 units, 251-300 -3 units, 301-350-4 units, > 350- 5 units at all meals as necessary      insulin glargine (LANTUS SOLOSTAR) 100 units/mL injection pen Lantus 12  units in the morning 15 mL 4    lisinopril-hydrochlorothiazide (PRINZIDE,ZESTORETIC) 10-12 5 MG per tablet Take 1 tablet by mouth daily      metFORMIN (GLUCOPHAGE) 500 mg tablet Take 1 tablet (500 mg total) by mouth daily 90 tablet 0    pravastatin (PRAVACHOL) 80 mg tablet Take 80 mg by mouth daily      traZODone (DESYREL) 100 mg tablet Take 100 mg by mouth daily at bedtime      Insulin Pen Needle 31G X 8 MM MISC Use 4 (four) times a day 400 each 1     No current facility-administered medications for this visit  No Known Allergies    Objective   Vitals: Blood pressure 122/64, pulse 67, temperature (!) 96 5 °F (35 8 °C), weight 71 2 kg (157 lb)  Physical Exam  Vitals reviewed  Constitutional:       General: He is not in acute distress  Appearance: Normal appearance  He is well-developed  HENT:      Head: Normocephalic and atraumatic  Eyes:      Pupils: Pupils are equal, round, and reactive to light  Neck:      Thyroid: No thyromegaly  Cardiovascular:      Rate and Rhythm: Normal rate and regular rhythm  Heart sounds: Normal heart sounds  Pulmonary:      Effort: Pulmonary effort is normal  No respiratory distress  Breath sounds: Normal breath sounds  Musculoskeletal:         General: No deformity  Normal range of motion  Cervical back: Normal range of motion and neck supple  Skin:     General: Skin is warm and dry  Findings: No erythema  Neurological:      General: No focal deficit present  Mental Status: He is alert and oriented to person, place, and time  Psychiatric:         Mood and Affect: Mood normal          Behavior: Behavior normal          The history was obtained from the review of the chart, patient      Lab Results:   Lab Results   Component Value Date/Time    Hemoglobin A1C 7 0 (H) 05/25/2022 08:39 AM    Hemoglobin A1C 6 9 (H) 01/21/2022 08:35 AM    Hemoglobin A1C 7 2 (H) 09/17/2021 09:19 AM    BUN 12 05/25/2022 08:39 AM    BUN 18 01/21/2022 08:35 AM    BUN 19 09/17/2021 09:19 AM    Potassium 4 2 05/25/2022 08:39 AM    Potassium 4 1 01/21/2022 08:35 AM    Potassium 4 3 09/17/2021 09:19 AM    Chloride 103 05/25/2022 08:39 AM    Chloride 105 01/21/2022 08:35 AM    Chloride 104 09/17/2021 09:19 AM    CO2 32 05/25/2022 08:39 AM    CO2 31 01/21/2022 08:35 AM    CO2 29 09/17/2021 09:19 AM    Creatinine 1 08 05/25/2022 08:39 AM    Creatinine 1 20 01/21/2022 08:35 AM    Creatinine 1 16 09/17/2021 09:19 AM    HDL, Direct 58 01/21/2022 08:35 AM    Triglycerides 64 01/21/2022 08:35 AM           Imaging Studies: I have personally reviewed pertinent reports  Portions of the record may have been created with voice recognition software  Occasional wrong word or "sound a like" substitutions may have occurred due to the inherent limitations of voice recognition software  Read the chart carefully and recognize, using context, where substitutions have occurred

## 2022-06-15 ENCOUNTER — TELEPHONE (OUTPATIENT)
Dept: DIABETES SERVICES | Facility: CLINIC | Age: 77
End: 2022-06-15

## 2022-06-15 ENCOUNTER — TELEPHONE (OUTPATIENT)
Dept: ENDOCRINOLOGY | Facility: CLINIC | Age: 77
End: 2022-06-15

## 2022-06-15 DIAGNOSIS — E11.65 TYPE 2 DIABETES MELLITUS WITH HYPERGLYCEMIA, WITH LONG-TERM CURRENT USE OF INSULIN (HCC): ICD-10-CM

## 2022-06-15 DIAGNOSIS — Z79.4 TYPE 2 DIABETES MELLITUS WITH HYPERGLYCEMIA, WITH LONG-TERM CURRENT USE OF INSULIN (HCC): ICD-10-CM

## 2022-06-15 RX ORDER — FLASH GLUCOSE SENSOR
1 KIT MISCELLANEOUS
Qty: 6 EACH | Refills: 1 | Status: SHIPPED | OUTPATIENT
Start: 2022-06-15 | End: 2022-07-14

## 2022-06-16 ENCOUNTER — TELEPHONE (OUTPATIENT)
Dept: ENDOCRINOLOGY | Facility: CLINIC | Age: 77
End: 2022-06-16

## 2022-06-16 DIAGNOSIS — Z79.4 TYPE 2 DIABETES MELLITUS WITH HYPERGLYCEMIA, WITH LONG-TERM CURRENT USE OF INSULIN (HCC): ICD-10-CM

## 2022-06-16 DIAGNOSIS — E11.65 TYPE 2 DIABETES MELLITUS WITH HYPERGLYCEMIA, WITH LONG-TERM CURRENT USE OF INSULIN (HCC): ICD-10-CM

## 2022-06-16 NOTE — TELEPHONE ENCOUNTER
Pt stopped at office states his Yifan Maria Tzay reader is showing red explained to pt he is having lows but pt insists the reader is broken  Gave pt new reader     Rosalinda report attached

## 2022-06-16 NOTE — TELEPHONE ENCOUNTER
Pt camed in with his Chuy Patricio saying that its not working   He's getting lows but he says its the machine

## 2022-06-16 NOTE — TELEPHONE ENCOUNTER
Reviewed continues glucose monitor sensor by Rosalinda, 80% blood sugars are in target range    Patient is having hypoglycemic episodes specially after breakfast and after lunch    Please inform patient to reduce Lantus, to 10 units (as per record is taking 12 units)  And he should only use the scale with each meal if his blood sugar is above 200 mg/dl ( 200-300- 1 units, >301- 2 units)     Betsy Carney MD

## 2022-07-14 DIAGNOSIS — Z79.4 TYPE 2 DIABETES MELLITUS WITH HYPERGLYCEMIA, WITH LONG-TERM CURRENT USE OF INSULIN (HCC): Primary | ICD-10-CM

## 2022-07-14 DIAGNOSIS — E11.65 TYPE 2 DIABETES MELLITUS WITH HYPERGLYCEMIA, WITH LONG-TERM CURRENT USE OF INSULIN (HCC): Primary | ICD-10-CM

## 2022-07-14 RX ORDER — FLASH GLUCOSE SENSOR
1 KIT MISCELLANEOUS
Qty: 6 EACH | Refills: 2 | Status: SHIPPED | OUTPATIENT
Start: 2022-07-14 | End: 2022-07-15

## 2022-07-15 RX ORDER — FLASH GLUCOSE SENSOR
1 KIT MISCELLANEOUS
Qty: 6 EACH | Refills: 1 | Status: SHIPPED | OUTPATIENT
Start: 2022-07-15

## 2022-07-15 RX ORDER — FLASH GLUCOSE SCANNING READER
1 EACH MISCELLANEOUS
Qty: 1 EACH | Refills: 0 | Status: SHIPPED | OUTPATIENT
Start: 2022-07-15

## 2022-08-23 ENCOUNTER — OFFICE VISIT (OUTPATIENT)
Dept: ENDOCRINOLOGY | Facility: CLINIC | Age: 77
End: 2022-08-23
Payer: MEDICARE

## 2022-08-23 VITALS
WEIGHT: 158 LBS | DIASTOLIC BLOOD PRESSURE: 64 MMHG | SYSTOLIC BLOOD PRESSURE: 130 MMHG | BODY MASS INDEX: 20.28 KG/M2 | HEART RATE: 79 BPM | HEIGHT: 74 IN

## 2022-08-23 DIAGNOSIS — E10.65 TYPE 1 DIABETES MELLITUS WITH HYPERGLYCEMIA (HCC): Primary | ICD-10-CM

## 2022-08-23 DIAGNOSIS — E78.2 MIXED HYPERLIPIDEMIA: ICD-10-CM

## 2022-08-23 DIAGNOSIS — I10 ESSENTIAL HYPERTENSION: ICD-10-CM

## 2022-08-23 PROCEDURE — 99214 OFFICE O/P EST MOD 30 MIN: CPT | Performed by: PHYSICIAN ASSISTANT

## 2022-08-23 PROCEDURE — 95251 CONT GLUC MNTR ANALYSIS I&R: CPT | Performed by: PHYSICIAN ASSISTANT

## 2022-08-23 RX ORDER — LORAZEPAM 0.5 MG/1
TABLET ORAL
COMMUNITY
Start: 2022-08-09

## 2022-08-23 NOTE — PROGRESS NOTES
Patient Progress Note      CC: DM   Patient is here with his brother-in-law     Referring Provider  No referring provider defined for this encounter  History of Present Illness:   Brad Velez is a 68 y o  male with a history of type 1 diabetes with long term use of insulin  His antibodies are normal but C-peptide is low  Diabetes course has been stable  Complications of DM: CKD  Denies recent illness or hospitalizations  Denies any issues with his current regimen  Home glucose monitoring: are performed regularly  Use Freestyle Rosalinda  Average glucose 138 mg/dl   In target range 85%, above range 14%, below range 1%  Current regimen: Lantus 10 units daily, Novolog scale only at meals PRN (using 4 units TID with meals), metformin 500 mg daily (using it BID)  compliant most of the time, denies any side effects from medications  Injects in: thigh, abdomen  Rotates sites: Yes  Hypoglycemic episodes: Yes, rare  H/o of hypoglycemia causing hospitalization or Intervention such as glucagon injection or ambulance call : Yes  Hypoglycemia symptoms: dizzy / unsteady  Treatment of hypoglycemia: discussed treatment      Medic alert tag: recommended: Yes     Diabetes education: Yes  Diet: 3 meals per day, 1-2 snacks per day  Timing of meals is predictable  Diabetic diet compliance: compliant some of the time  Activity: Daily activity is predictable: Yes  He tries to walk 5 days a week  Ophthamology: goes to [facility]: goes 3 times a year; [facility]      Has hypertension: on ACE inhibitor/ARB, compliant most of the time  Has hyperlipidemia: on statin - tolerating well, no myalgias  compliant most of the time, denies any side effects from medications    Thyroid disorders: No  History of pancreatitis: No    Patient Active Problem List   Diagnosis    LBBB (left bundle branch block)    Essential hypertension    Pulmonary nodule    Type [facility] with [facility] )    Mixed hyperlipidemia    Stage 3a chronic kidney disease ([place] [address] )      Past Medical History:   Diagnosis Date    Benign essential hypertension     Diabetes mellitus ([facility] )     Hyperlipidemia     LBBB (left bundle branch block)       History reviewed  No pertinent surgical history  Family History   Problem Relation Age of Onset    Diabetes unspecified Father     Diabetes unspecified Brother      Social History     Tobacco Use    Smoking status: Former Smoker     Types: Cigarettes     Quit date:      Years since quittin 6    Smokeless tobacco: Never Used   Substance Use Topics    Alcohol use: Not Currently     No Known Allergies      Current Outpatient Medications:     amLODIPine (NORVASC) 5 mg tablet, Take 5 mg by mouth daily , Disp: , Rfl:     ammonium lactate (LAC-HYDRIN) 12 % lotion, , Disp: , Rfl:     aspirin 81 MG tablet, Take 1 tablet by mouth daily, Disp: , Rfl:     brimonidine tartrate 0 2 % ophthalmic solution, , Disp: , Rfl:     Cholecalciferol 2000 units CAPS, Take 2,000 Units by mouth every other day , Disp: , Rfl:     citalopram (CeleXA) 40 mg tablet, Take 20 mg by mouth daily , Disp: , Rfl:     Continuous Blood Gluc  (FreeStyle Rosalinda 14 Day [place]) [facility], Use 1 Device every 14 (fourteen) days, Disp: 1 each, Rfl: 0    Continuous Blood Gluc Sensor (FreeStyle Rosalinda 14 Day Sensor) MISC, Use 1 Device every 14 (fourteen) days, Disp: 6 each, Rfl: 1    cyanocobalamin (VITAMIN B-12) 500 MCG tablet, Vitamin B-12 TABS TAKE 1 TABLET DAILY  Refills: 0     Active, Disp: , Rfl:     gabapentin, once-daily, (GRALISE) 300 MG tablet, Take 600 mg by mouth daily at bedtime , Disp: , Rfl:     insulin aspart (NovoLOG) 100 Units/mL injection pen, Inject 4 units with breakfast, 3 units with lunch and 4 units with dinner plus scale   Scale - 150-200 -1 units, 201-250-2 units, 251-300 -3 units, 301-350-4 units, > 350- 5 units at all meals as necessary, Disp: 15 mL, Rfl:     insulin glargine (LANTUS SOLOSTAR) 100 units/mL injection pen, Lantus 8 units in the morning, Disp: 15 mL, Rfl: 4    Insulin Pen Needle 31G X 8 MM MISC, Use 4 (four) times a day, Disp: 400 each, Rfl: 1    lisinopril-hydrochlorothiazide (PRINZIDE,ZESTORETIC) 10-12 5 MG per tablet, Take 1 tablet by mouth daily, Disp: , Rfl:     LORazepam (ATIVAN) 0 5 mg tablet, TAKE 1 TABLET (0 5 MG TOTAL)BY MOUTH ONE TIME FOR 1 DOSE, 30 MINUTES BEFORE THE MRI, Disp: , Rfl:     metFORMIN (GLUCOPHAGE) 500 mg tablet, Take 1 tablet (500 mg total) by mouth daily, Disp: 90 tablet, Rfl: 0    pravastatin (PRAVACHOL) 80 mg tablet, Take 80 mg by mouth daily, Disp: , Rfl:     traZODone (DESYREL) 100 mg tablet, Take 100 mg by mouth daily at bedtime, Disp: , Rfl:     busPIRone (BUSPAR) 5 mg tablet, Take 5 mg by mouth 2 (two) times a day (Patient not taking: Reported on 8/23/2022), Disp: , Rfl:   Review of Systems   Constitutional: Negative for activity change, appetite change, fatigue and unexpected weight change  HENT: Negative for trouble swallowing  Eyes: Negative for visual disturbance  Respiratory: Negative for shortness of breath  Cardiovascular: Negative for chest pain and palpitations  Gastrointestinal: Negative for constipation and diarrhea  Endocrine: Negative for polydipsia and polyuria  Musculoskeletal: Negative  Skin: Negative for wound  Neurological: Positive for numbness (mild tingling)  Psychiatric/Behavioral: Negative  Physical Exam:  Body mass index is 20 29 kg/m²  /64   Pulse 79   Ht 6' 2" (1 88 m)   Wt 71 7 kg (158 lb)   BMI 20 29 kg/m²    Wt Readings from Last 3 Encounters:   08/23/22 71 7 kg (158 lb)   06/02/22 71 2 kg (157 lb)   03/11/22 72 6 kg (160 lb)       Physical Exam  Vitals and nursing note reviewed  Constitutional:       Appearance: He is well-developed  HENT:      Head: Normocephalic  Eyes:      General: No scleral icterus       Pupils: Pupils are equal, round, and reactive to light  Neck:      Thyroid: No thyromegaly  Cardiovascular:      Rate and Rhythm: Normal rate and regular rhythm  Pulses:           Radial pulses are 2+ on the right side and 2+ on the left side  Heart sounds: No murmur heard  Pulmonary:      Effort: Pulmonary effort is normal  No respiratory distress  Breath sounds: Normal breath sounds  No wheezing  Musculoskeletal:      Cervical back: Neck supple  Skin:     General: Skin is warm and dry  Neurological:      Mental Status: He is alert  Patient's shoes and socks were not removed  Labs:   Lab Results   Component Value Date    HGBA1C 7 0 (H) 05/25/2022     Lab Results   Component Value Date    GLUCOSE 115 05/01/2015    CALCIUM 9 5 05/25/2022     05/01/2015    K 4 2 05/25/2022    CO2 32 05/25/2022     05/25/2022    BUN 12 05/25/2022    CREATININE 1 08 05/25/2022     No results found for: Yolette Subramanian  eGFR   Date Value Ref Range Status   05/25/2022 66 ml/min/1 73sq m Final     No components found for: [facility]  Lab Results   Component Value Date    HDL 58 01/21/2022    TRIG 64 01/21/2022     Lab Results   Component Value Date    ALT 15 05/12/2021    AST 11 05/12/2021    ALKPHOS 79 05/12/2021     No results found for: [id number], TSH, J2ZPUUT, N8MSMQX, THYROIDAB    Impression:  1  Type 1 diabetes mellitus with hyperglycemia (HCC)    2  Essential hypertension    3  Mixed hyperlipidemia           Plan:    Diagnoses and all orders for this visit:    Type 1 diabetes mellitus with hyperglycemia ([facility] )  HGA1C 7 0%  Treatment regimen: occasionally has low BG after lunch / before dinner and early morning  Reduce Lantus to 8 units and Novolog to 4-3-4 units TID with meals plus scale  [id number] again in 2 weeks  Skip dose of Novolog if skipping meal   Discussed intensive insulin regimen does increase risk for hypoglycemia   Episodes of hypoglycemia can lead to permanent disability and death   Discussed risks/complications associated with uncontrolled diabetes  Advised to adhere to diabetic diet, and recommended staying active/exercising routinely as tolerated  Keep carbohydrates consistent to limit blood glucose fluctuations  Advised to call if blood sugars less than 70 mg/dl or over 300 mg/dl  Check blood glucose 3+ times a day  Discussed symptoms and treatment of hypoglycemia  Discussed use of CGM to collect additional blood glucose data to reveal trends and patterns that can be used to optimize treatment plan  Recommended routine follow-up with podiatry and ophthalmology  Send log in 2 weeks  Ordered blood work to complete prior to next visit  -     insulin glargine (LANTUS SOLOSTAR) 100 units/mL injection pen; Lantus 8 units in the morning  -     insulin aspart (NovoLOG) 100 Units/mL injection pen; Inject 4 units with breakfast, 3 units with lunch and 4 units with dinner plus scale  Scale - 150-200 -1 units, 201-250-2 units, 251-300 -3 units, 301-350-4 units, > 350- 5 units at all meals as necessary    Essential hypertension  Blood pressure well controlled, continue current treatment    Mixed hyperlipidemia  Continue statin therapy        Discussed with the patient diagnosis and treatment and all questions fully answered  He will call me if any problems arise  Counseled patient on diagnostic results, prognosis, risk and benefit of treatment options, instruction for management, importance of treatment compliance, risk factor reduction and impressions        Shaila Hdz PA-C

## 2022-08-23 NOTE — PATIENT INSTRUCTIONS
Hypoglycemia instructions   Jorge Luis Linaressaman  8/23/2022  [id number]    Low Blood Sugar    Steps to treat low blood sugar  1  Test blood sugar if you have symptoms of low blood sugar:   Low Blood Sugar Symptoms:  o Sweaty  o Dizzy  o Rapid heartbeat  o Shaky  o Bad mood  o Hungry      2  Treat blood sugar less than 70 with 15 grams of fast-acting carbohydrate:   Examples of 15 grams Fast-Acting Carbohydrate:  o 4 oz juice  o 4 oz regular soda  o 3-4 glucose tablets (chew)  o 3-4 hard candies (chew)          3  Wait 15 minutes and test your blood sugar again     4   If blood sugar is less than 100, repeat steps 2-3     5  When your blood sugar is 100 or more, eat a snack if it will be longer than one hour until your next meal  The snack should be 15 grams of carbohydrate and a protein:   Examples of snacks:  o ½ sandwich  o 6 crackers with cheese  o Piece of fruit with cheese or peanut butter  o 6 crackers with peanut butter

## 2022-09-06 ENCOUNTER — TELEPHONE (OUTPATIENT)
Dept: ENDOCRINOLOGY | Facility: CLINIC | Age: 77
End: 2022-09-06

## 2022-09-06 NOTE — TELEPHONE ENCOUNTER
Reviewed recent freestyle [employer]  Average blood sugars 137 he is in target range 82% the time, and low 2% of time  He continues to have the occasional low blood sugars overnight in after lunch  Blood sugars do seem to trend upwards after breakfast also  At this time I would like to change his Lantus to 7 units daily  As for his NovoLog take 5 units with breakfast, 2 units with lunch, 4 units with dinner  Would like to see another download in about 2 weeks

## 2022-09-07 ENCOUNTER — DOCUMENTATION (OUTPATIENT)
Dept: ENDOCRINOLOGY | Facility: CLINIC | Age: 77
End: 2022-09-07

## 2022-09-07 ENCOUNTER — FOLLOW UP (OUTPATIENT)
Dept: URBAN - METROPOLITAN AREA CLINIC 6 | Facility: CLINIC | Age: 77
End: 2022-09-07

## 2022-09-07 DIAGNOSIS — H25.812: ICD-10-CM

## 2022-09-07 DIAGNOSIS — Z96.1: ICD-10-CM

## 2022-09-07 DIAGNOSIS — E11.3593: ICD-10-CM

## 2022-09-07 DIAGNOSIS — H40.1132: ICD-10-CM

## 2022-09-07 DIAGNOSIS — Z79.4: ICD-10-CM

## 2022-09-07 PROCEDURE — 92133 CPTRZD OPH DX IMG PST SGM ON: CPT

## 2022-09-07 PROCEDURE — 92014 COMPRE OPH EXAM EST PT 1/>: CPT

## 2022-09-07 PROCEDURE — 92202 OPSCPY EXTND ON/MAC DRAW: CPT

## 2022-09-07 ASSESSMENT — TONOMETRY
OD_IOP_MMHG: 15
OS_IOP_MMHG: 18

## 2022-09-07 ASSESSMENT — VISUAL ACUITY
OU_CC: J2
OD_CC: 20/40
OD_PH: 20/30-2
OS_CC: 20/40-1

## 2022-09-14 ENCOUNTER — TELEPHONE (OUTPATIENT)
Dept: ENDOCRINOLOGY | Facility: CLINIC | Age: 77
End: 2022-09-14

## 2022-09-14 NOTE — TELEPHONE ENCOUNTER
Blood sugars are in  mg/dL range  Blood sugars are acceptable    Please inform patient to continue current dose of Lantus, NovoLog and metformin    Mahesh Woodard MD

## 2022-09-23 ENCOUNTER — APPOINTMENT (OUTPATIENT)
Dept: LAB | Facility: AMBULARY SURGERY CENTER | Age: 77
End: 2022-09-23
Payer: MEDICARE

## 2022-09-23 DIAGNOSIS — E11.65 TYPE 2 DIABETES MELLITUS WITH HYPERGLYCEMIA, WITH LONG-TERM CURRENT USE OF INSULIN (HCC): ICD-10-CM

## 2022-09-23 DIAGNOSIS — Z79.4 TYPE 2 DIABETES MELLITUS WITH HYPERGLYCEMIA, WITH LONG-TERM CURRENT USE OF INSULIN (HCC): ICD-10-CM

## 2022-09-23 LAB
ANION GAP SERPL CALCULATED.3IONS-SCNC: 1 MMOL/L (ref 4–13)
BUN SERPL-MCNC: 14 MG/DL (ref 5–25)
CALCIUM SERPL-MCNC: 9.4 MG/DL (ref 8.3–10.1)
CHLORIDE SERPL-SCNC: 105 MMOL/L (ref 96–108)
CO2 SERPL-SCNC: 32 MMOL/L (ref 21–32)
CREAT SERPL-MCNC: 1.11 MG/DL (ref 0.6–1.3)
CREAT UR-MCNC: 85.6 MG/DL
EST. AVERAGE GLUCOSE BLD GHB EST-MCNC: 143 MG/DL
GFR SERPL CREATININE-BSD FRML MDRD: 64 ML/MIN/1.73SQ M
GLUCOSE P FAST SERPL-MCNC: 126 MG/DL (ref 65–99)
HBA1C MFR BLD: 6.6 %
MICROALBUMIN UR-MCNC: 6.7 MG/L (ref 0–20)
MICROALBUMIN/CREAT 24H UR: 8 MG/G CREATININE (ref 0–30)
POTASSIUM SERPL-SCNC: 4.2 MMOL/L (ref 3.5–5.3)
SODIUM SERPL-SCNC: 138 MMOL/L (ref 135–147)

## 2022-09-23 PROCEDURE — 83036 HEMOGLOBIN GLYCOSYLATED A1C: CPT

## 2022-09-23 PROCEDURE — 82043 UR ALBUMIN QUANTITATIVE: CPT

## 2022-09-23 PROCEDURE — 82570 ASSAY OF URINE CREATININE: CPT

## 2022-09-23 PROCEDURE — 80048 BASIC METABOLIC PNL TOTAL CA: CPT

## 2022-09-23 PROCEDURE — 36415 COLL VENOUS BLD VENIPUNCTURE: CPT

## 2022-10-31 ENCOUNTER — TELEPHONE (OUTPATIENT)
Dept: ENDOCRINOLOGY | Facility: CLINIC | Age: 77
End: 2022-10-31

## 2022-12-01 ENCOUNTER — OFFICE VISIT (OUTPATIENT)
Dept: ENDOCRINOLOGY | Facility: CLINIC | Age: 77
End: 2022-12-01

## 2022-12-01 ENCOUNTER — TELEPHONE (OUTPATIENT)
Dept: ENDOCRINOLOGY | Facility: CLINIC | Age: 77
End: 2022-12-01

## 2022-12-01 VITALS
HEIGHT: 74 IN | WEIGHT: 157 LBS | TEMPERATURE: 97.9 F | BODY MASS INDEX: 20.15 KG/M2 | DIASTOLIC BLOOD PRESSURE: 80 MMHG | SYSTOLIC BLOOD PRESSURE: 140 MMHG | HEART RATE: 64 BPM

## 2022-12-01 DIAGNOSIS — E78.2 MIXED HYPERLIPIDEMIA: ICD-10-CM

## 2022-12-01 DIAGNOSIS — I10 ESSENTIAL HYPERTENSION: ICD-10-CM

## 2022-12-01 DIAGNOSIS — E10.65 TYPE 1 DIABETES MELLITUS WITH HYPERGLYCEMIA (HCC): Primary | ICD-10-CM

## 2022-12-01 NOTE — PATIENT INSTRUCTIONS
Hypoglycemia instructions   [facility]  12/1/2022  [id number]    Low Blood Sugar    Steps to treat low blood sugar  1  Test blood sugar if you have symptoms of low blood sugar:   Low Blood Sugar Symptoms:  o Sweaty  o Dizzy  o Rapid heartbeat  o Shaky  o Bad mood  o Hungry      2  Treat blood sugar less than 70 with 15 grams of fast-acting carbohydrate:   Examples of 15 grams Fast-Acting Carbohydrate:  o 4 oz juice  o 4 oz regular soda  o 3-4 glucose tablets (chew)  o 3-4 hard candies (chew)          3  Wait 15 minutes and test your blood sugar again     4   If blood sugar is less than 100, repeat steps 2-3     5  When your blood sugar is 100 or more, eat a snack if it will be longer than one hour until your next meal  The snack should be 15 grams of carbohydrate and a protein:   Examples of snacks:  o ½ sandwich  o 6 crackers with cheese  o Piece of fruit with cheese or peanut butter  o 6 crackers with peanut butter

## 2022-12-01 NOTE — PROGRESS NOTES
Patient Progress Note      CC: DM      Referring Provider  No referring provider defined for this encounter  History of Present Illness:   Gale Arias is a 68 y o  male with a history of type 1 diabetes with long term use of insulin  His antibodies are normal but C-peptide is low  Diabetes course has been stable  Complications of DM: CKD  Denies recent illness or hospitalizations  Denies any issues with his current regimen  Home glucose monitoring: are performed regularly  He was using a iFLYER but was using a glucometer when receiving radiation therapy  Now he has a break for at least 4 weeks so he will go back using the iFLYER  No log or meter today     Current regimen: Lantus 8 units daily (using 7 units), Novolog 4-3-4 units TID with meals plus scale (using 5-2-4 units TID with meals), metformin 500 mg daily  compliant most of the time, denies any side effects from medications  Injects in: thigh, abdomen, arm  Rotates sites: Yes  Hypoglycemic episodes: No, rare  H/o of hypoglycemia causing hospitalization or Intervention such as glucagon injection or ambulance call : Yes  Hypoglycemia symptoms: dizzy / unsteady  Treatment of hypoglycemia: discussed treatment      Medic alert tag: recommended: Yes     Diabetes education: Yes  Diet: 3 meals per day, 2-3 snacks per day  Timing of meals is predictable  Diabetic diet compliance: compliant some of the time  Activity: Daily activity is predictable: Yes  He uses a pedal machine  Ophthamology: goes to [facility]: goes 3 times a year; [facility]  May 2022     Has hypertension: on ACE inhibitor/ARB, compliant most of the time  Has hyperlipidemia: on statin - tolerating well, no myalgias  compliant most of the time, denies any side effects from medications    Thyroid disorders: No  History of pancreatitis: No    Patient Active Problem List   Diagnosis   • LBBB (left bundle branch block)   • Essential hypertension   • Pulmonary nodule   • Type 1 diabetes mellitus with hyperglycemia (HCC)   • Mixed hyperlipidemia   • Stage 3a chronic kidney disease (HCC)      Past Medical History:   Diagnosis Date   • Benign essential hypertension    • Diabetes mellitus ([address] )    • Hyperlipidemia    • LBBB (left bundle branch block)       History reviewed  No pertinent surgical history  Family History   Problem Relation Age of Onset   • Diabetes unspecified Father    • Diabetes unspecified Brother      Social History     Tobacco Use   • Smoking status: Former     Types: Cigarettes     Quit date:      Years since quittin 9   • Smokeless tobacco: Never   Substance Use Topics   • Alcohol use: Not Currently     No Known Allergies      Current Outpatient Medications:   •  amLODIPine (NORVASC) 5 mg tablet, Take 5 mg by mouth daily , Disp: , Rfl:   •  ammonium lactate (LAC-HYDRIN) 12 % lotion, , Disp: , Rfl:   •  aspirin 81 MG tablet, Take 1 tablet by mouth daily, Disp: , Rfl:   •  brimonidine tartrate 0 2 % ophthalmic solution, , Disp: , Rfl:   •  Cholecalciferol 2000 units CAPS, Take 2,000 Units by mouth every other day , Disp: , Rfl:   •  citalopram (CeleXA) 40 mg tablet, Take 20 mg by mouth daily , Disp: , Rfl:   •  Continuous Blood Gluc  (FreeStyle Rosalinda 14 Day [place]) JIMMY, Use 1 Device every 14 (fourteen) days, Disp: 1 each, Rfl: 0  •  Continuous Blood Gluc Sensor (FreeStyle Rosalinda 14 Day Sensor) MISC, Use 1 Device every 14 (fourteen) days, Disp: 6 each, Rfl: 1  •  cyanocobalamin (VITAMIN B-12) 500 MCG tablet, Vitamin B-12 TABS TAKE 1 TABLET DAILY     Refills: 0     Active, Disp: , Rfl:   •  gabapentin, once-daily, (GRALISE) 300 MG tablet, Take 600 mg by mouth daily at bedtime , Disp: , Rfl:   •  insulin glargine (LANTUS SOLOSTAR) 100 units/mL injection pen, Lantus 7 units in the morning, Disp: 15 mL, Rfl: 4  •  Insulin Pen Needle 31G X 8 MM MISC, Use 4 (four) times a day, Disp: 400 each, Rfl: 1  •  lisinopril-hydrochlorothiazide (PRINZIDE,ZESTORETIC) 10-12 5 MG per tablet, Take 1 tablet by mouth daily, Disp: , Rfl:   •  LORazepam (ATIVAN) 0 5 mg tablet, TAKE 1 TABLET (0 5 MG TOTAL)BY MOUTH ONE TIME FOR 1 DOSE, 30 MINUTES BEFORE THE MRI, Disp: , Rfl:   •  metFORMIN (GLUCOPHAGE) 500 mg tablet, Take 1 tablet (500 mg total) by mouth daily, Disp: 90 tablet, Rfl: 0  •  pravastatin (PRAVACHOL) 80 mg tablet, Take 80 mg by mouth daily, Disp: , Rfl:   •  traZODone (DESYREL) 100 mg tablet, Take 100 mg by mouth daily at bedtime, Disp: , Rfl:   •  busPIRone (BUSPAR) 5 mg tablet, Take 5 mg by mouth 2 (two) times a day (Patient not taking: Reported on 12/1/2022), Disp: , Rfl:   •  insulin aspart (NovoLOG) 100 Units/mL injection pen, Inject 5 units with breakfast, 2 units with lunch and 4 units with dinner plus scale  Scale - 150-200 -1 units, 201-250-2 units, 251-300 -3 units, 301-350-4 units, > 350- 5 units, Disp: 15 mL, Rfl: 0  Review of Systems   Constitutional: Positive for fatigue  Negative for activity change, appetite change and unexpected weight change  HENT: Negative for trouble swallowing  Eyes: Negative for visual disturbance  Respiratory: Negative for shortness of breath  Cardiovascular: Negative for chest pain and palpitations  Gastrointestinal: Negative for constipation and diarrhea  Endocrine: Negative for polydipsia and polyuria  Musculoskeletal: Negative  Skin: Negative for wound  Neurological: Negative for numbness  Psychiatric/Behavioral: Negative  Physical Exam:  Body mass index is 20 16 kg/m²  /80   Pulse 64   Temp 97 9 °F (36 6 °C) (Skin)   Ht 6' 2" (1 88 m)   Wt 71 2 kg (157 lb)   BMI 20 16 kg/m²    Wt Readings from Last 3 Encounters:   12/01/22 71 2 kg (157 lb)   08/23/22 71 7 kg (158 lb)   06/02/22 71 2 kg (157 lb)       Physical Exam  Vitals and nursing note reviewed  Constitutional:       Appearance: He is well-developed and well-nourished  HENT:      Head: Normocephalic     Eyes: General: No scleral icterus  Extraocular Movements: EOM normal       Pupils: Pupils are equal, round, and reactive to light  Neck:      Thyroid: No thyromegaly  Cardiovascular:      Rate and Rhythm: Normal rate and regular rhythm  Pulses:           Radial pulses are 2+ on the right side and 2+ on the left side  Heart sounds: No murmur heard  Pulmonary:      Effort: Pulmonary effort is normal  No respiratory distress  Breath sounds: Normal breath sounds  No wheezing  Musculoskeletal:      Cervical back: Neck supple  Skin:     General: Skin is warm and dry  Neurological:      Mental Status: He is alert  Psychiatric:         Mood and Affect: Mood and affect normal        Patient's shoes and socks were not removed  Labs:   Component      Latest Ref Rng & Units 5/25/2022 9/23/2022   Sodium      135 - 147 mmol/L 135 (L) 138   Potassium      3 5 - 5 3 mmol/L 4 2 4 2   Chloride      96 - 108 mmol/L 103 105   CO2      21 - 32 mmol/L 32 32   Anion Gap      4 - 13 mmol/L 0 (L) 1 (L)   BUN      5 - 25 mg/dL 12 14   Creatinine      0 60 - 1 30 mg/dL 1 08 1 11   GLUCOSE FASTING      65 - 99 mg/dL 226 (H) 126 (H)   Calcium      8 3 - 10 1 mg/dL 9 5 9 4   eGFR      ml/min/1 73sq m 66 64   EXT Creatinine Urine      mg/dL  85 6   MICROALBUM ,U,RANDOM      0 0 - 20 0 mg/L  6 7   MICROALBUMIN/CREATININE RATIO      0 - 30 mg/g creatinine  8   Hemoglobin A1C      Normal 3 8-5 6%; PreDiabetic 5 7-6 4%; Diabetic >=6 5%; Glycemic control for adults with diabetes <7 0% % 7 0 (H) 6 6 (H)   eAG, EST AVG Glucose      mg/dl 154 143     Plan:    Diagnoses and all orders for this visit:    Type 1 diabetes mellitus with hyperglycemia ([facility] [address] )  HGA1C 6 6%  Due again in 1 month  Treatment regimen: continue current treatment  Unable to make changes as patient did not bring log  Advised to send log  Discussed intensive insulin regimen does increase risk for hypoglycemia   Episodes of hypoglycemia can lead to permanent disability and death  Discussed risks/complications associated with uncontrolled diabetes  Advised to adhere to diabetic diet, and recommended staying active/exercising routinely as tolerated  Keep carbohydrates consistent to limit blood glucose fluctuations  Advised to call if blood sugars less than 70 mg/dl or over 300 mg/dl  Check blood glucose 3+ times a day  Discussed symptoms and treatment of hypoglycemia  Discussed use of CGM to collect additional blood glucose data to reveal trends and patterns that can be used to optimize treatment plan  Referred to diabetes/nutrition education  Recommended routine follow-up with podiatry and ophthalmology  Ordered blood work to complete prior to next visit  -     Hemoglobin A1C; Future  -     Basic metabolic panel; Future  -     Hemoglobin A1C; Future  -     Basic metabolic panel; Future  -     Microalbumin / creatinine urine ratio; Future  -     insulin aspart (NovoLOG) 100 Units/mL injection pen; Inject 5 units with breakfast, 2 units with lunch and 4 units with dinner plus scale  Scale - 150-200 -1 units, 201-250-2 units, 251-300 -3 units, 301-350-4 units, > 350- 5 units  -     insulin glargine (LANTUS SOLOSTAR) 100 units/mL injection pen; Lantus 7 units in the morning    Essential hypertension  Blood pressure adequately controlled, continue current treatment  -     Basic metabolic panel; Future    Mixed hyperlipidemia  LDL previously 82  Continue statin therapy  Managed by PCP  -     Lipid panel; Future         Discussed with the patient diagnosis and treatment and all questions fully answered  He will call me if any problems arise  Counseled patient on diagnostic results, prognosis, risk and benefit of treatment options, instruction for management, importance of treatment compliance, risk factor reduction and impressions        Shaila Hdz PA-C

## 2022-12-21 ENCOUNTER — APPOINTMENT (OUTPATIENT)
Dept: LAB | Facility: AMBULARY SURGERY CENTER | Age: 77
End: 2022-12-21

## 2022-12-21 DIAGNOSIS — E10.65 TYPE 1 DIABETES MELLITUS WITH HYPERGLYCEMIA (HCC): ICD-10-CM

## 2022-12-21 DIAGNOSIS — E78.2 MIXED HYPERLIPIDEMIA: ICD-10-CM

## 2022-12-21 DIAGNOSIS — I10 ESSENTIAL HYPERTENSION: ICD-10-CM

## 2022-12-21 LAB
ANION GAP SERPL CALCULATED.3IONS-SCNC: 2 MMOL/L (ref 4–13)
BUN SERPL-MCNC: 14 MG/DL (ref 5–25)
CALCIUM SERPL-MCNC: 9.2 MG/DL (ref 8.3–10.1)
CHLORIDE SERPL-SCNC: 105 MMOL/L (ref 96–108)
CHOLEST SERPL-MCNC: 152 MG/DL
CO2 SERPL-SCNC: 33 MMOL/L (ref 21–32)
CREAT SERPL-MCNC: 0.99 MG/DL (ref 0.6–1.3)
CREAT UR-MCNC: 217 MG/DL
EST. AVERAGE GLUCOSE BLD GHB EST-MCNC: 154 MG/DL
GFR SERPL CREATININE-BSD FRML MDRD: 73 ML/MIN/1.73SQ M
GLUCOSE P FAST SERPL-MCNC: 207 MG/DL (ref 65–99)
HBA1C MFR BLD: 7 %
HDLC SERPL-MCNC: 77 MG/DL
LDLC SERPL CALC-MCNC: 63 MG/DL (ref 0–100)
MICROALBUMIN UR-MCNC: 35.4 MG/L (ref 0–20)
MICROALBUMIN/CREAT 24H UR: 16 MG/G CREATININE (ref 0–30)
NONHDLC SERPL-MCNC: 75 MG/DL
POTASSIUM SERPL-SCNC: 3.8 MMOL/L (ref 3.5–5.3)
SODIUM SERPL-SCNC: 140 MMOL/L (ref 135–147)
TRIGL SERPL-MCNC: 59 MG/DL

## 2023-01-13 ENCOUNTER — TELEPHONE (OUTPATIENT)
Dept: ENDOCRINOLOGY | Facility: CLINIC | Age: 78
End: 2023-01-13

## 2023-01-13 NOTE — LETTER
01/16/23    Gearline Cho-    Change Scale to: 201-250-1 units, 251-300 -2 units, 301-350-3 units, > 350- 4 units

## 2023-01-16 NOTE — TELEPHONE ENCOUNTER
Date and time and Carlen Babinski are off  Please recommend patient correct his settings  There is often a sudden drop in BG levels after highs  Confirm he is taking fast acting insulin before meals rather than after meals     Would recommend change in scale   Current Scale: 150-200 -1 units, 201-250-2 units, 251-300 -3 units, 301-350-4 units, > 350- 5 units  Change Scale to: 201-250-1 units, 251-300 -2 units, 301-350-3 units, > 350- 4 units

## 2023-03-09 DIAGNOSIS — Z79.4 TYPE 2 DIABETES MELLITUS WITH HYPERGLYCEMIA, WITH LONG-TERM CURRENT USE OF INSULIN (HCC): ICD-10-CM

## 2023-03-09 DIAGNOSIS — E10.65 TYPE 1 DIABETES MELLITUS WITH HYPERGLYCEMIA (HCC): ICD-10-CM

## 2023-03-09 DIAGNOSIS — E11.65 TYPE 2 DIABETES MELLITUS WITH HYPERGLYCEMIA, WITH LONG-TERM CURRENT USE OF INSULIN (HCC): ICD-10-CM

## 2023-03-09 RX ORDER — FLASH GLUCOSE SENSOR
1 KIT MISCELLANEOUS
Qty: 6 EACH | Refills: 1 | Status: SHIPPED | OUTPATIENT
Start: 2023-03-09

## 2023-03-10 ENCOUNTER — TELEPHONE (OUTPATIENT)
Dept: ENDOCRINOLOGY | Facility: CLINIC | Age: 78
End: 2023-03-10

## 2023-03-10 NOTE — TELEPHONE ENCOUNTER
Blood sugars are very well controlled  Good job    Continue current management, current dose of NovoLog and Lantus as well as metformin    Swapna Jones

## 2023-03-15 ENCOUNTER — IOP CHECK (OUTPATIENT)
Dept: URBAN - METROPOLITAN AREA CLINIC 6 | Facility: CLINIC | Age: 78
End: 2023-03-15

## 2023-03-15 DIAGNOSIS — Z96.1: ICD-10-CM

## 2023-03-15 DIAGNOSIS — H40.1132: ICD-10-CM

## 2023-03-15 PROCEDURE — 92083 EXTENDED VISUAL FIELD XM: CPT

## 2023-03-15 PROCEDURE — 92014 COMPRE OPH EXAM EST PT 1/>: CPT

## 2023-03-15 PROCEDURE — 92020 GONIOSCOPY: CPT

## 2023-03-15 ASSESSMENT — TONOMETRY
OD_IOP_MMHG: 15
OS_IOP_MMHG: 12

## 2023-03-15 ASSESSMENT — VISUAL ACUITY
OS_CC: 20/30
OD_CC: 20/30-2

## 2023-05-10 ENCOUNTER — OFFICE VISIT (OUTPATIENT)
Dept: ENDOCRINOLOGY | Facility: CLINIC | Age: 78
End: 2023-05-10

## 2023-05-10 VITALS
HEART RATE: 78 BPM | WEIGHT: 167.8 LBS | SYSTOLIC BLOOD PRESSURE: 160 MMHG | BODY MASS INDEX: 21.53 KG/M2 | HEIGHT: 74 IN | DIASTOLIC BLOOD PRESSURE: 76 MMHG | OXYGEN SATURATION: 98 %

## 2023-05-10 DIAGNOSIS — E10.65 TYPE 1 DIABETES MELLITUS WITH HYPERGLYCEMIA (HCC): Primary | ICD-10-CM

## 2023-05-10 DIAGNOSIS — I10 ESSENTIAL HYPERTENSION: ICD-10-CM

## 2023-05-10 DIAGNOSIS — E78.2 MIXED HYPERLIPIDEMIA: ICD-10-CM

## 2023-05-10 LAB — SL AMB POCT HGB: 274

## 2023-05-10 NOTE — PROGRESS NOTES
Mercy Oliva 68 y o  male MRN: [id number]    Encounter: [id number]      Assessment/Plan     Assessment: This is a 68y o -year-old male with diabetes with hyperglycemia  Plan:    Diagnoses and all orders for this visit:    Type 1 diabetes mellitus with hyperglycemia ([address] )    Lab Results   Component Value Date    HGBA1C 7 3 (H) 04/14/2023   A1c 7 3%, no hypoglycemia  At goal  Blood sugars are usually elevated during the day as per patient, will increase Lantus to 8 units in the morning  Continue NovoLog 5 units before meals, sliding scale was provided to patient if the blood sugar is above 150 mg per DL  Educated about hypoglycemia symptoms and treatment  The importance of follow-up with ophthalmology and podiatry discussed    -     POCT hemoglobin fingerstick  -     insulin glargine (LANTUS SOLOSTAR) 100 units/mL injection pen; Lantus 8 units in the morning  -     insulin aspart (NovoLOG) 100 Units/mL injection pen; Inject 5 units before meals  Scale - 150-200 -1 units, 201-250-2 units, 251-300 -3 units, 301-350-4 units, > 350- 5 units  -     glucose 4 g chewable tablet; Chew 4 tablets (16 g total) as needed for low blood sugar    Essential hypertension  Blood pressure is slightly elevated today, discussed the goal for blood pressure is less than 130/80 mmHg  Continue current management  Mixed hyperlipidemia    Lab Results   Component Value Date    LDLCALC 63 12/21/2022   Currently on pravastatin, continue statins and lifestyle modifications    CC: Diabetes type 1      History of Present Illness     HPI:    Mercy Oliva is 77-year-old male with medical history of type 1 diabetes, managed on basal bolus insulin regimen, hypertension and hyperlipidemia is here for follow-up  His continuous glucose monitor sensor by [facility] for glucose monitoring, could not download the reader    Today,    Current regimen is NovoLog 5 units before breakfast, 2 units before lunch and 4 units before dinner plus sliding scale,, insulin Lantus 7 units in the morning,, metformin 500 mg daily  He admits compliance to his regimen, denies any issues  He brought the log, but his blood sugars are usually in 120 to 150 mg per DL range  As per patient he is not having any hypoglycemic episodes less than 70 mg per DL  Denies any hospitalization recently or new medical illnesses  He takes Pravachol 80 mg daily at night  For hypertension, he takes Norvasc 5 mg daily  Component      Latest Ref Rng & Units 4/14/2023   Sodium      135 - 147 mmol/L 139   Potassium      3 5 - 5 3 mmol/L 4 2   Chloride      96 - 108 mmol/L 107   CO2      21 - 32 mmol/L 30   Anion Gap      4 - 13 mmol/L 2 (L)   BUN      5 - 25 mg/dL 22   Creatinine      0 60 - 1 30 mg/dL 1 10   GLUCOSE FASTING      65 - 99 mg/dL 147 (H)   Calcium      8 3 - 10 1 mg/dL 10 0   eGFR      ml/min/1 73sq m 64   EXT Creatinine Urine      mg/dL 225 0   MICROALBUM ,U,RANDOM      0 0 - 20 0 mg/L 21 6 (H)   MICROALBUMIN/CREATININE RATIO      0 - 30 mg/g creatinine 10   Hemoglobin A1C      Normal 3 8-5 6%; PreDiabetic 5 7-6 4%; Diabetic >=6 5%; Glycemic control for adults with diabetes <7 0% % 7 3 (H)   eAG, EST AVG Glucose      mg/dl 163   Hemoglobin            BP Readings from Last 3 Encounters:   05/10/23 160/76   12/01/22 140/80   08/23/22 130/64       Lab Results   Component Value Date    HGBA1C 7 3 (H) 04/14/2023     Diabetic Foot Exam    Patient's shoes and socks removed  Right Foot/Ankle   Right Foot Inspection  Skin Exam: skin normal, skin intact and dry skin  No warmth, no callus, no erythema, no maceration, no abnormal color, no pre-ulcer, no ulcer and no callus  Toe Exam: ROM and strength within normal limits  Sensory   Vibration: intact  Proprioception: intact  Monofilament testing: intact    Vascular  The right DP pulse is 1+  The right PT pulse is 1+  Left Foot/Ankle  Left Foot Inspection  Skin Exam: skin normal, skin intact and dry skin   No warmth, no erythema, no maceration, normal color, no pre-ulcer, no ulcer and no callus  Toe Exam: ROM and strength within normal limits  Sensory   Vibration: intact  Proprioception: intact  Monofilament testing: intact    Vascular  The left DP pulse is 1+  The left PT pulse is 1+  Assign Risk Category  No deformity present  No loss of protective sensation  Weak pulses  Risk: 1      Component      Latest Ref Rng & Units 4/14/2023   Sodium      135 - 147 mmol/L 139   Potassium      3 5 - 5 3 mmol/L 4 2   Chloride      96 - 108 mmol/L 107   CO2      21 - 32 mmol/L 30   Anion Gap      4 - 13 mmol/L 2 (L)   BUN      5 - 25 mg/dL 22   Creatinine      0 60 - 1 30 mg/dL 1 10   GLUCOSE FASTING      65 - 99 mg/dL 147 (H)   Calcium      8 3 - 10 1 mg/dL 10 0   eGFR      ml/min/1 73sq m 64   EXT Creatinine Urine      mg/dL 225 0   MICROALBUM ,U,RANDOM      0 0 - 20 0 mg/L 21 6 (H)   MICROALBUMIN/CREATININE RATIO      0 - 30 mg/g creatinine 10   Hemoglobin A1C      Normal 3 8-5 6%; PreDiabetic 5 7-6 4%; Diabetic >=6 5%; Glycemic control for adults with diabetes <7 0% % 7 3 (H)   eAG, EST AVG Glucose      mg/dl 163     Review of Systems   Constitutional: Negative for activity change, diaphoresis, fatigue, fever and unexpected weight change  HENT: Negative  Eyes: Negative for visual disturbance  Respiratory: Negative for cough, chest tightness and shortness of breath  Cardiovascular: Negative for chest pain, palpitations and leg swelling  Gastrointestinal: Negative for abdominal pain, blood in stool, constipation, diarrhea, nausea and vomiting  Endocrine: Negative for cold intolerance, heat intolerance, polydipsia, polyphagia and polyuria  Genitourinary: Negative for dysuria, enuresis, frequency and urgency  Musculoskeletal: Negative for arthralgias and myalgias  Skin: Negative for pallor, rash and wound  Allergic/Immunologic: Negative  Neurological: Negative for dizziness, tremors, weakness and numbness  Hematological: Negative  Psychiatric/Behavioral: Negative  Historical Information   Past Medical History:   Diagnosis Date   • Benign essential hypertension    • Diabetes mellitus ([facility] [address] )    • Hyperlipidemia    • LBBB (left bundle branch block)      History reviewed  No pertinent surgical history  Social History   Social History     Substance and Sexual Activity   Alcohol Use Not Currently     Social History     Substance and Sexual Activity   Drug Use Not Currently     Social History     Tobacco Use   Smoking Status Former   • Types: Cigarettes   • Quit date:    • Years since quittin 3   Smokeless Tobacco Never     Family History:   Family History   Problem Relation Age of Onset   • Diabetes unspecified Father    • Diabetes unspecified Brother        Meds/Allergies   Current Outpatient Medications   Medication Sig Dispense Refill   • amLODIPine (NORVASC) 5 mg tablet Take 5 mg by mouth daily      • ammonium lactate (LAC-HYDRIN) 12 % lotion      • aspirin 81 MG tablet Take 1 tablet by mouth daily     • Cholecalciferol 2000 units CAPS Take 2,000 Units by mouth every other day      • Continuous Blood Gluc  (FreeStyle Rosalinda 14 Day [place]) JIMMY Use 1 Device every 14 (fourteen) days 1 each 0   • Continuous Blood Gluc Sensor (FreeStyle Rosalinda 14 Day Sensor) MISC Use 1 Device every 14 (fourteen) days 6 each 1   • cyanocobalamin (VITAMIN B-12) 500 MCG tablet Vitamin B-12 TABS  TAKE 1 TABLET DAILY      Refills: 0       Active     • gabapentin, once-daily, (GRALISE) 300 MG tablet Take 600 mg by mouth daily at bedtime      • glucose 4 g chewable tablet Chew 4 tablets (16 g total) as needed for low blood sugar 160 tablet 0   • insulin aspart (NovoLOG) 100 Units/mL injection pen Inject 5 units before meals  Scale - 150-200 -1 units, 201-250-2 units, 251-300 -3 units, 301-350-4 units, > 350- 5 units 15 mL 0   • insulin glargine (LANTUS SOLOSTAR) 100 units/mL injection pen Lantus 8 units in the morning 15 "mL 4   • Insulin Pen Needle 31G X 8 MM MISC Use 4 (four) times a day 400 each 1   • lisinopril-hydrochlorothiazide (PRINZIDE,ZESTORETIC) 10-12 5 MG per tablet Take 1 tablet by mouth daily     • metFORMIN (GLUCOPHAGE) 500 mg tablet Take 1 tablet (500 mg total) by mouth daily 90 tablet 0   • pravastatin (PRAVACHOL) 80 mg tablet Take 80 mg by mouth daily     • traZODone (DESYREL) 100 mg tablet Take 100 mg by mouth daily at bedtime     • brimonidine tartrate 0 2 % ophthalmic solution      • busPIRone (BUSPAR) 5 mg tablet Take 5 mg by mouth 2 (two) times a day (Patient not taking: Reported on 12/1/2022)     • citalopram (CeleXA) 40 mg tablet Take 20 mg by mouth daily      • LORazepam (ATIVAN) 0 5 mg tablet TAKE 1 TABLET (0 5 MG TOTAL)BY MOUTH ONE TIME FOR 1 DOSE, 30 MINUTES BEFORE THE MRI       No current facility-administered medications for this visit  No Known Allergies    Objective   Vitals: Blood pressure 160/76, pulse 78, height 6' 2\" (1 88 m), weight 76 1 kg (167 lb 12 8 oz), SpO2 98 %  Physical Exam  Vitals reviewed  Constitutional:       General: He is not in acute distress  Appearance: He is well-developed  HENT:      Head: Normocephalic and atraumatic  Eyes:      Pupils: Pupils are equal, round, and reactive to light  Neck:      Thyroid: No thyromegaly  Cardiovascular:      Rate and Rhythm: Normal rate and regular rhythm  Pulses: Pulses are weak  Dorsalis pedis pulses are 1+ on the right side and 1+ on the left side  Posterior tibial pulses are 1+ on the right side and 1+ on the left side  Heart sounds: Normal heart sounds  Pulmonary:      Effort: Pulmonary effort is normal  No respiratory distress  Breath sounds: Normal breath sounds  Musculoskeletal:         General: No deformity  Normal range of motion  Cervical back: Normal range of motion and neck supple  Feet:      Right foot:      Skin integrity: Dry skin present   No ulcer, skin breakdown, " "erythema, warmth or callus  Left foot:      Skin integrity: Dry skin present  No ulcer, skin breakdown, erythema, warmth or callus  Skin:     General: Skin is warm and dry  Findings: No erythema  Neurological:      General: No focal deficit present  Mental Status: He is alert and oriented to person, place, and time  Psychiatric:         Mood and Affect: Mood normal          Behavior: Behavior normal          The history was obtained from the review of the chart, patient  Lab Results:   Lab Results   Component Value Date/Time    Hemoglobin A1C 7 3 (H) 04/14/2023 10:05 AM    Hemoglobin A1C 7 0 (H) 12/21/2022 08:42 AM    Hemoglobin A1C 6 6 (H) 09/23/2022 08:55 AM    Hemoglobin 274 05/10/2023 10:47 AM    BUN 22 04/14/2023 10:05 AM    BUN 14 12/21/2022 08:42 AM    BUN 14 09/23/2022 08:55 AM    Potassium 4 2 04/14/2023 10:05 AM    Potassium 3 8 12/21/2022 08:42 AM    Potassium 4 2 09/23/2022 08:55 AM    Chloride 107 04/14/2023 10:05 AM    Chloride 105 12/21/2022 08:42 AM    Chloride 105 09/23/2022 08:55 AM    CO2 30 04/14/2023 10:05 AM    CO2 33 (H) 12/21/2022 08:42 AM    CO2 32 09/23/2022 08:55 AM    Creatinine 1 10 04/14/2023 10:05 AM    Creatinine 0 99 12/21/2022 08:42 AM    Creatinine 1 11 09/23/2022 08:55 AM    HDL, Direct 77 12/21/2022 08:42 AM    Triglycerides 59 12/21/2022 08:42 AM           Imaging Studies: I have personally reviewed pertinent reports  Portions of the record may have been created with voice recognition software  Occasional wrong word or \"sound a like\" substitutions may have occurred due to the inherent limitations of voice recognition software  Read the chart carefully and recognize, using context, where substitutions have occurred    "

## 2023-05-10 NOTE — PATIENT INSTRUCTIONS
INSULIN DOSAGE INSTRUCTIONS    Name: Herb Miranda                        : 1945  MRN #: [id number]    Your Current Insulin  and dose is: Before Breakfast Before Lunch Before Evening Meal Bedtime     Novolog Insulin 5 units      5 units  5 units     Regular, Apidra, Humalog orNovolog Sliding Scale:   <80              151-200 + 1 +1 +1    201-250 + 2 +2 +2 +   251-300 + 3 +3 +3 +   301-350 + 4 +4 +4 +   >350 +5 +5 +5 +       Lantus Insulin   8 units         Additional Instructions:   Please test your blood sugar:  _4_ Times per day  X_ Before Breakfast                _ Alternate Testing  X_ Before Lunch                _ 2 Hours After  Meal  X_ Before Evening Meal               _ 3 a m   x_ Before Bedtime Snack     Target Blood sugar range _70_to _140__  Call if your Lakeisha Jaramillo MD  blood sugar is less than _60_ or greater than _400__  Today's Date: 5/10/2023       Hypoglycemia instructions   Herb Miranda  5/10/2023  [id number]    Low Blood Sugar    Steps to treat low blood sugar  1  Test blood sugar if you have symptoms of low blood sugar:   Low Blood Sugar Symptoms:  o Sweaty  o Dizzy  o Rapid heartbeat  o Shaky    o Bad mood  o Hungry      2  Treat blood sugar less than 70 with 15 grams of fast-acting carbohydrate:   Examples of 15 grams Fast-Acting Carbohydrate:  o 4 oz juice  o 4 oz regular soda  o 3-4 glucose tablets (chew)  o 3-4 hard candies (chew)              3    Wait 15 minutes and test your blood sugar again           4   If blood sugar is less than 100, repeat steps 2-3       5  When your blood sugar is 100 or more, eat a snack if it will be longer than one hour until your next meal  The snack should be 15 grams of carbohydrate and a protein:   Examples of snacks:  o ½ sandwich  o 6 crackers with cheese  o Piece of fruit with cheese or peanut butter  o 6 crackers with peanut butter

## 2023-07-13 ENCOUNTER — TELEPHONE (OUTPATIENT)
Dept: ENDOCRINOLOGY | Facility: CLINIC | Age: 78
End: 2023-07-13

## 2023-07-13 LAB
DME PARACHUTE DELIVERY DATE REQUESTED: NORMAL
DME PARACHUTE ITEM DESCRIPTION: NORMAL
DME PARACHUTE ORDER STATUS: NORMAL
DME PARACHUTE SUPPLIER NAME: NORMAL
DME PARACHUTE SUPPLIER PHONE: NORMAL

## 2023-07-13 NOTE — TELEPHONE ENCOUNTER
Pt walked in. He lost his [place].   Can we give him the one that was just donated by another pt?

## 2023-08-22 ENCOUNTER — FOLLOW UP (OUTPATIENT)
Dept: URBAN - METROPOLITAN AREA CLINIC 6 | Facility: CLINIC | Age: 78
End: 2023-08-22

## 2023-08-22 DIAGNOSIS — Z79.4: ICD-10-CM

## 2023-08-22 DIAGNOSIS — H25.812: ICD-10-CM

## 2023-08-22 DIAGNOSIS — Z96.1: ICD-10-CM

## 2023-08-22 DIAGNOSIS — E11.3593: ICD-10-CM

## 2023-08-22 DIAGNOSIS — H40.1132: ICD-10-CM

## 2023-08-22 PROCEDURE — 92202 OPSCPY EXTND ON/MAC DRAW: CPT

## 2023-08-22 PROCEDURE — 92133 CPTRZD OPH DX IMG PST SGM ON: CPT

## 2023-08-22 PROCEDURE — 92014 COMPRE OPH EXAM EST PT 1/>: CPT

## 2023-08-22 ASSESSMENT — TONOMETRY
OS_IOP_MMHG: 15
OD_IOP_MMHG: 16

## 2023-08-22 ASSESSMENT — VISUAL ACUITY
OD_CC: 20/40-1
OU_CC: J2
OS_CC: 20/40-2

## 2023-08-23 LAB
DME PARACHUTE DELIVERY DATE ACTUAL: NORMAL
DME PARACHUTE DELIVERY DATE REQUESTED: NORMAL
DME PARACHUTE ITEM DESCRIPTION: NORMAL
DME PARACHUTE ORDER STATUS: NORMAL
DME PARACHUTE SUPPLIER NAME: NORMAL
DME PARACHUTE SUPPLIER PHONE: NORMAL

## 2023-08-24 ENCOUNTER — TELEPHONE (OUTPATIENT)
Dept: ENDOCRINOLOGY | Facility: CLINIC | Age: 78
End: 2023-08-24

## 2023-08-24 NOTE — TELEPHONE ENCOUNTER
Reviewed blood sugar log as well as chas sensor, blood sugars are well controlled in 90 to 140 mg per DL range. Please inform patient to continue current regimen.     Shruti Norwood

## 2023-09-14 ENCOUNTER — TELEPHONE (OUTPATIENT)
Dept: DIABETES SERVICES | Facility: CLINIC | Age: 78
End: 2023-09-14

## 2023-09-14 DIAGNOSIS — E10.65 TYPE 1 DIABETES MELLITUS WITH HYPERGLYCEMIA (HCC): ICD-10-CM

## 2023-09-14 RX ORDER — INSULIN GLARGINE 100 [IU]/ML
INJECTION, SOLUTION SUBCUTANEOUS
Qty: 15 ML | Refills: 4 | Status: SHIPPED | OUTPATIENT
Start: 2023-09-14

## 2023-09-14 NOTE — TELEPHONE ENCOUNTER
Pt's wife lm on dm education vm stating that patient needs a renewal of Lantus. Please return patient's call.  Thanks

## 2023-09-20 ENCOUNTER — OFFICE VISIT (OUTPATIENT)
Dept: ENDOCRINOLOGY | Facility: CLINIC | Age: 78
End: 2023-09-20
Payer: MEDICARE

## 2023-09-20 VITALS
SYSTOLIC BLOOD PRESSURE: 112 MMHG | WEIGHT: 166.2 LBS | DIASTOLIC BLOOD PRESSURE: 50 MMHG | HEIGHT: 74 IN | HEART RATE: 58 BPM | BODY MASS INDEX: 21.33 KG/M2

## 2023-09-20 DIAGNOSIS — E10.65 TYPE 1 DIABETES MELLITUS WITH HYPERGLYCEMIA (HCC): Primary | ICD-10-CM

## 2023-09-20 DIAGNOSIS — I10 ESSENTIAL HYPERTENSION: ICD-10-CM

## 2023-09-20 DIAGNOSIS — E78.2 MIXED HYPERLIPIDEMIA: ICD-10-CM

## 2023-09-20 LAB — SL AMB POCT HEMOGLOBIN AIC: 6.6 (ref ?–6.5)

## 2023-09-20 PROCEDURE — 99214 OFFICE O/P EST MOD 30 MIN: CPT | Performed by: INTERNAL MEDICINE

## 2023-09-20 PROCEDURE — 95251 CONT GLUC MNTR ANALYSIS I&R: CPT | Performed by: INTERNAL MEDICINE

## 2023-09-20 PROCEDURE — 83036 HEMOGLOBIN GLYCOSYLATED A1C: CPT | Performed by: INTERNAL MEDICINE

## 2023-09-20 NOTE — PROGRESS NOTES
Victor Manuel Crow 68 y.o. male MRN: [id number]    Encounter: [id number]      Assessment/Plan     Assessment: This is a 68y.o.-year-old male with diabetes with hyperglycemia. Plan:    Diagnoses and all orders for this visit:    Type 1 diabetes mellitus with hyperglycemia ([address])    Lab Results   Component Value Date    HGBA1C 7.3 (H) 04/14/2023     · A1c is 6.9% today  · Denies having hypoglycemia symptoms usually after meals, will reduce NovoLog to 3 units before breakfast, lunch and dinner plus sliding scale  ·  . Discussed the hypoglycemia symptoms and treatment   · . Discussed the sick rules   ·  . Discussed to hold short acting insulin if not eating   ·  . Discussed the importance of compliance   · Discussed the importance of follow-up with ophthalmology and podiatry    -     POCT hemoglobin A1c  -     insulin aspart (NovoLOG) 100 Units/mL injection pen; Inject 3 units before meals +scale ( do not take insulin if blood sugar < 90 mg/dl)  -     Basic metabolic panel; Future  -     Hemoglobin A1C; Future  -     Albumin / creatinine urine ratio; Future    Mixed hyperlipidemia  Continue statins, Pravachol 80 mg daily    Essential hypertension  Blood pressure is well controlled. Continue current management      CC: Diabetes    History of Present Illness     HPI:    Victor Manuel Crow 66-year-old male with medical history of type 1 diabetes, managed on basal bolus insulin regimen, hypertension and hyperlipidemia is here for follow-up. He uses continuous glucose monitor sensor regularly. Use of continuous glucose monitor sensor is helping to adjust the insulin regimen and prevent hypoglycemia and hyperglycemia. Current regimen is Lantus 7 units in the morning, NovoLog 5 units with breakfast, lunch and dinner plus scale  Takes metformin 500 mg daily in the morning. He admits compliance to his regimen. Denies any issues    Patient uses continuous glucose monitor sensor regularly, freestyle chas.   14 days overview from September 7, 2023 to September 28, 2023 reviewed. More than 72 hours of data reviewed. 49% CGM is active. Average glucose is 149 mg per DL, GMI is 6.9%, glucose variability is 28.9%  78% blood sugars are in target range, 1% blood sugars are low, 0% blood sugars are very low, 19% blood sugars are high, 2% blood sugars are very high. Patient has low blood sugars during the day especially after breakfast and sometimes after lunch. He eats 3 meals, there are very small meals. For hyperlipidemia he takes Pravachol 80 mg daily at night, for hypertension he takes Norvasc 5 mg daily      Lab Results   Component Value Date    HGBA1C 7.3 (H) 04/14/2023       Lab Results   Component Value Date    HGBA1C 7.3 (H) 04/14/2023       Review of Systems   Constitutional: Negative for activity change, diaphoresis, fatigue, fever and unexpected weight change. HENT: Negative. Eyes: Negative for visual disturbance. Respiratory: Negative for cough, chest tightness and shortness of breath. Cardiovascular: Negative for chest pain, palpitations and leg swelling. Gastrointestinal: Negative for abdominal pain, blood in stool, constipation, diarrhea, nausea and vomiting. Endocrine: Negative for cold intolerance, heat intolerance, polydipsia, polyphagia and polyuria. Genitourinary: Positive for frequency. Negative for enuresis and urgency. Musculoskeletal: Positive for arthralgias and myalgias. Skin: Negative for pallor, rash and wound. Allergic/Immunologic: Negative. Neurological: Negative for dizziness, tremors, weakness and numbness. Hematological: Negative. Psychiatric/Behavioral: Negative. Historical Information   Past Medical History:   Diagnosis Date   • Benign essential hypertension    • Diabetes mellitus ([address])    • Hyperlipidemia    • LBBB (left bundle branch block)      No past surgical history on file.   Social History   Social History     Substance and Sexual Activity   Alcohol Use Not Currently     Social History     Substance and Sexual Activity   Drug Use Not Currently     Social History     Tobacco Use   Smoking Status Former   • Types: Cigarettes   • Quit date:    • Years since quittin.7   Smokeless Tobacco Never     Family History:   Family History   Problem Relation Age of Onset   • Diabetes unspecified Father    • Diabetes unspecified Brother        Meds/Allergies   Current Outpatient Medications   Medication Sig Dispense Refill   • Continuous Blood Gluc  (FreeStyle Rosalinda 14 Day [place]) JIMMY Use 1 Device every 14 (fourteen) days 1 each 0   • Continuous Blood Gluc Sensor (FreeStyle Rosalinda 14 Day Sensor) MISC Use 1 Device every 14 (fourteen) days 6 each 1   • insulin aspart (NovoLOG) 100 Units/mL injection pen Inject 3 units before meals +scale ( do not take insulin if blood sugar < 90 mg/dl) 15 mL 0   • Insulin Glargine Solostar (Lantus SoloStar) 100 UNIT/ML SOPN inject 12 unit subcutaneously every morning (Patient taking differently: inject 7 unit subcutaneously every morning) 15 mL 4   • Insulin Pen Needle 31G X 8 MM MISC Use 4 (four) times a day 400 each 1   • amLODIPine (NORVASC) 5 mg tablet Take 5 mg by mouth daily      • ammonium lactate (LAC-HYDRIN) 12 % lotion      • aspirin 81 MG tablet Take 1 tablet by mouth daily     • brimonidine tartrate 0.2 % ophthalmic solution      • busPIRone (BUSPAR) 5 mg tablet Take 5 mg by mouth 2 (two) times a day (Patient not taking: Reported on 2022)     • Cholecalciferol 2000 units CAPS Take 2,000 Units by mouth every other day      • citalopram (CeleXA) 40 mg tablet Take 20 mg by mouth daily      • cyanocobalamin (VITAMIN B-12) 500 MCG tablet Vitamin B-12 TABS  TAKE 1 TABLET DAILY.     Refills: 0       Active     • gabapentin, once-daily, (GRALISE) 300 MG tablet Take 600 mg by mouth daily at bedtime      • glucose 4 g chewable tablet Chew 4 tablets (16 g total) as needed for low blood sugar 160 tablet 0   • lisinopril-hydrochlorothiazide (PRINZIDE,ZESTORETIC) 10-12.5 MG per tablet Take 1 tablet by mouth daily     • LORazepam (ATIVAN) 0.5 mg tablet TAKE 1 TABLET (0.5 MG TOTAL)BY MOUTH ONE TIME FOR 1 DOSE, 30 MINUTES BEFORE THE MRI     • metFORMIN (GLUCOPHAGE) 500 mg tablet Take 1 tablet (500 mg total) by mouth daily 90 tablet 0   • pravastatin (PRAVACHOL) 80 mg tablet Take 80 mg by mouth daily     • traZODone (DESYREL) 100 mg tablet Take 100 mg by mouth daily at bedtime       No current facility-administered medications for this visit. No Known Allergies    Objective   Vitals: Blood pressure 112/50, pulse 58, height 6' 2" (1.88 m), weight 75.4 kg (166 lb 3.2 oz). Physical Exam  Vitals reviewed. Constitutional:       General: He is not in acute distress. Appearance: He is well-developed. HENT:      Head: Normocephalic and atraumatic. Eyes:      Pupils: Pupils are equal, round, and reactive to light. Neck:      Thyroid: No thyromegaly. Cardiovascular:      Rate and Rhythm: Normal rate and regular rhythm. Pulses: Normal pulses. Heart sounds: Normal heart sounds. Pulmonary:      Effort: Pulmonary effort is normal. No respiratory distress. Breath sounds: Normal breath sounds. Musculoskeletal:         General: No deformity. Normal range of motion. Cervical back: Normal range of motion and neck supple. Skin:     General: Skin is warm and dry. Findings: No erythema. Neurological:      General: No focal deficit present. Mental Status: He is alert and oriented to person, place, and time. The history was obtained from the review of the chart, patient.     Lab Results:   Lab Results   Component Value Date/Time    Hemoglobin A1C 7.3 (H) 04/14/2023 10:05 AM    Hemoglobin A1C 7.0 (H) 12/21/2022 08:42 AM    Hemoglobin A1C 6.6 (H) 09/23/2022 08:55 AM    Hemoglobin 274 05/10/2023 10:47 AM    BUN 22 04/14/2023 10:05 AM    BUN 14 12/21/2022 08:42 AM    BUN 14 09/23/2022 08:55 AM    Potassium 4.2 04/14/2023 10:05 AM    Potassium 3.8 12/21/2022 08:42 AM    Potassium 4.2 09/23/2022 08:55 AM    Chloride 107 04/14/2023 10:05 AM    Chloride 105 12/21/2022 08:42 AM    Chloride 105 09/23/2022 08:55 AM    CO2 30 04/14/2023 10:05 AM    CO2 33 (H) 12/21/2022 08:42 AM    CO2 32 09/23/2022 08:55 AM    Creatinine 1.10 04/14/2023 10:05 AM    Creatinine 0.99 12/21/2022 08:42 AM    Creatinine 1.11 09/23/2022 08:55 AM    HDL, Direct 77 12/21/2022 08:42 AM    Triglycerides 59 12/21/2022 08:42 AM           Imaging Studies: I have personally reviewed pertinent reports. Portions of the record may have been created with voice recognition software. Occasional wrong word or "sound a like" substitutions may have occurred due to the inherent limitations of voice recognition software. Read the chart carefully and recognize, using context, where substitutions have occurred.

## 2023-09-27 ENCOUNTER — TELEPHONE (OUTPATIENT)
Dept: ENDOCRINOLOGY | Facility: CLINIC | Age: 78
End: 2023-09-27

## 2023-09-27 LAB
DME PARACHUTE DELIVERY DATE REQUESTED: NORMAL
DME PARACHUTE ITEM DESCRIPTION: NORMAL
DME PARACHUTE ITEM DESCRIPTION: NORMAL
DME PARACHUTE ORDER STATUS: NORMAL
DME PARACHUTE SUPPLIER NAME: NORMAL
DME PARACHUTE SUPPLIER PHONE: NORMAL

## 2023-11-03 ENCOUNTER — TELEPHONE (OUTPATIENT)
Dept: ENDOCRINOLOGY | Facility: CLINIC | Age: 78
End: 2023-11-03

## 2023-11-07 LAB
DME PARACHUTE DELIVERY DATE ACTUAL: NORMAL
DME PARACHUTE DELIVERY DATE REQUESTED: NORMAL
DME PARACHUTE ITEM DESCRIPTION: NORMAL
DME PARACHUTE ITEM DESCRIPTION: NORMAL
DME PARACHUTE ORDER STATUS: NORMAL
DME PARACHUTE SUPPLIER NAME: NORMAL
DME PARACHUTE SUPPLIER PHONE: NORMAL

## 2023-11-28 DIAGNOSIS — E10.65 TYPE 1 DIABETES MELLITUS WITH HYPERGLYCEMIA (HCC): ICD-10-CM

## 2024-01-09 ENCOUNTER — FOLLOW UP (OUTPATIENT)
Dept: URBAN - METROPOLITAN AREA CLINIC 6 | Facility: CLINIC | Age: 79
End: 2024-01-09

## 2024-01-09 DIAGNOSIS — H40.1132: ICD-10-CM

## 2024-01-09 PROCEDURE — 92083 EXTENDED VISUAL FIELD XM: CPT

## 2024-01-09 PROCEDURE — 92012 INTRM OPH EXAM EST PATIENT: CPT

## 2024-01-09 ASSESSMENT — TONOMETRY
OD_IOP_MMHG: 13
OS_IOP_MMHG: 15

## 2024-01-09 ASSESSMENT — VISUAL ACUITY
OD_CC: 20/50-1
OS_CC: 20/40-1

## 2024-01-10 ENCOUNTER — HOSPITAL ENCOUNTER (OUTPATIENT)
Dept: CT IMAGING | Facility: HOSPITAL | Age: 79
Discharge: HOME | End: 2024-01-10
Payer: COMMERCIAL

## 2024-01-10 DIAGNOSIS — R63.4 ABNORMAL WEIGHT LOSS: ICD-10-CM

## 2024-01-10 PROCEDURE — 74160 CT ABDOMEN W/CONTRAST: CPT

## 2024-01-10 RX ADMIN — IOHEXOL 100 ML: 350 INJECTION, SOLUTION INTRAVENOUS at 09:22

## 2024-01-17 ENCOUNTER — LAB (OUTPATIENT)
Dept: LAB | Facility: AMBULARY SURGERY CENTER | Age: 79
End: 2024-01-17
Payer: MEDICARE

## 2024-01-17 DIAGNOSIS — E10.65 TYPE 1 DIABETES MELLITUS WITH HYPERGLYCEMIA (HCC): ICD-10-CM

## 2024-01-17 LAB
ANION GAP SERPL CALCULATED.3IONS-SCNC: 6 MMOL/L
BUN SERPL-MCNC: 14 MG/DL (ref 5–25)
CALCIUM SERPL-MCNC: 9.8 MG/DL (ref 8.4–10.2)
CHLORIDE SERPL-SCNC: 103 MMOL/L (ref 96–108)
CO2 SERPL-SCNC: 32 MMOL/L (ref 21–32)
CREAT SERPL-MCNC: 0.96 MG/DL (ref 0.6–1.3)
CREAT UR-MCNC: 144.1 MG/DL
EST. AVERAGE GLUCOSE BLD GHB EST-MCNC: 166 MG/DL
GFR SERPL CREATININE-BSD FRML MDRD: 75 ML/MIN/1.73SQ M
GLUCOSE P FAST SERPL-MCNC: 192 MG/DL (ref 65–99)
HBA1C MFR BLD: 7.4 %
MICROALBUMIN UR-MCNC: 41.3 MG/L
MICROALBUMIN/CREAT 24H UR: 29 MG/G CREATININE (ref 0–30)
POTASSIUM SERPL-SCNC: 4 MMOL/L (ref 3.5–5.3)
SODIUM SERPL-SCNC: 141 MMOL/L (ref 135–147)

## 2024-01-17 PROCEDURE — 80048 BASIC METABOLIC PNL TOTAL CA: CPT

## 2024-01-17 PROCEDURE — 36415 COLL VENOUS BLD VENIPUNCTURE: CPT

## 2024-01-17 PROCEDURE — 82043 UR ALBUMIN QUANTITATIVE: CPT

## 2024-01-17 PROCEDURE — 82570 ASSAY OF URINE CREATININE: CPT

## 2024-01-17 PROCEDURE — 83036 HEMOGLOBIN GLYCOSYLATED A1C: CPT

## 2024-02-13 ENCOUNTER — TELEPHONE (OUTPATIENT)
Dept: NEPHROLOGY | Facility: CLINIC | Age: 79
End: 2024-02-13

## 2024-02-13 NOTE — TELEPHONE ENCOUNTER
Patient's wife called to cancel his 2/14/24 appointment with Dr. Satish Corley. Patient was just taken to the hospital. Please call wife at [phone].

## 2024-02-27 ENCOUNTER — OFFICE VISIT (OUTPATIENT)
Dept: ENDOCRINOLOGY | Facility: CLINIC | Age: 79
End: 2024-02-27
Payer: MEDICARE

## 2024-02-27 VITALS
WEIGHT: 175.2 LBS | HEART RATE: 64 BPM | SYSTOLIC BLOOD PRESSURE: 124 MMHG | DIASTOLIC BLOOD PRESSURE: 86 MMHG | OXYGEN SATURATION: 96 % | BODY MASS INDEX: 22.49 KG/M2

## 2024-02-27 DIAGNOSIS — E53.8 VITAMIN B 12 DEFICIENCY: Primary | ICD-10-CM

## 2024-02-27 DIAGNOSIS — E78.2 MIXED HYPERLIPIDEMIA: ICD-10-CM

## 2024-02-27 DIAGNOSIS — I10 ESSENTIAL HYPERTENSION: ICD-10-CM

## 2024-02-27 DIAGNOSIS — E10.65 TYPE 1 DIABETES MELLITUS WITH HYPERGLYCEMIA (HCC): ICD-10-CM

## 2024-02-27 PROCEDURE — 95251 CONT GLUC MNTR ANALYSIS I&R: CPT | Performed by: INTERNAL MEDICINE

## 2024-02-27 PROCEDURE — 99214 OFFICE O/P EST MOD 30 MIN: CPT | Performed by: INTERNAL MEDICINE

## 2024-02-27 RX ORDER — INSULIN GLARGINE 100 [IU]/ML
INJECTION, SOLUTION SUBCUTANEOUS
Start: 2024-02-27

## 2024-02-27 RX ORDER — LANOLIN ALCOHOL/MO/W.PET/CERES
6 CREAM (GRAM) TOPICAL
COMMUNITY
Start: 2024-02-16

## 2024-02-27 NOTE — PATIENT INSTRUCTIONS
Today's Date: 2/27/2024       Hypoglycemia instructions   Ibrahima De Santiago  2/27/2024  [id number]    Low Blood Sugar    Steps to treat low blood sugar.    1. Test blood sugar if you have symptoms of low blood sugar:   Low Blood Sugar Symptoms:  o Sweaty  o Dizzy  o Rapid heartbeat  o Shaky    o Bad mood  o Hungry      2. Treat blood sugar less than 70 with 15 grams of fast-acting carbohydrate:   Examples of 15 grams Fast-Acting Carbohydrate:  o 4 oz juice  o 4 oz regular soda  o 3-4 glucose tablets (chew)  o 3-4 hard candies (chew)              3.   Wait 15 minutes and test your blood sugar again           4. If blood sugar is less than 100, repeat steps 2-3.      5. When your blood sugar is 100 or more, eat a snack if it will be longer than one hour until your next meal. The snack should be 15 grams of carbohydrate and a protein:   Examples of snacks:  o ½ sandwich  o 6 crackers with cheese  o Piece of fruit with cheese or peanut butter  o 6 crackers with peanut butter

## 2024-02-27 NOTE — PROGRESS NOTES
Ibrahima De Santiago 78 y.o. male MRN: [id number]    Encounter: [id number]      Assessment/Plan     Assessment:  This is a 78 y.o.-year-old male with diabetes with hyperglycemia.    Plan:  Diagnoses and all orders for this visit:    Type 1 diabetes mellitus with hyperglycemia ([facility])  Lab Results   Component Value Date    HGBA1C 7.2 (H) 02/13/2024   A1c 7.2% which is at goal for his age.  However patient has lot of variability in blood sugars.  Based on continuous glucose monitor sensor, will reduce NovoLog to 5 units before breakfast and lunch and continue 7 units before dinner   Patient does not feel comfortable using correction scale.  Continue Lantus 12 units subcutaneously every morning  Educated about hypoglycemia symptoms and treatment.  Also discussed importance of use of continuous glucose monitor sensor regularly as it is helping for adjustment of insulin as well as for prevention of hypoglycemia and hyperglycemia      -     insulin aspart (NovoLOG) 100 Units/mL injection pen; Inject 5 units before breakfast and lunch and 7 units before dinner +scale ( do not take insulin if blood sugar < 90 mg/dl)  -     Insulin Glargine Solostar (Lantus SoloStar) 100 UNIT/ML SOPN; inject 12 unit subcutaneously every morning    Mixed hyperlipidemia  Continue statins as per PCP, goal for LDL is less than 100  Essential hypertension  Blood pressure is well-controlled, continue current management as per PCP  Other orders  -     melatonin 3 mg; Take 6 mg by mouth         CC: Diabetes    History of Present Illness     HPI:    Ibrahima De Santiago is 78-year-old male with medical history of type 1 diabetes with stage III CKD, left bundle branch block, hypertension, hyperlipidemia is here for follow-up. He was admitted to hospital for low blood sugar of <30, and unconscious ( after shoveling snow)     Current regimen includes Lantus 12 units daily in AM and Novolog 7 units with meals , denies missing doses     Uses continuous glucose  monitor sensor by [employer].  More than 72 hours of data reviewed.  2 weeks ago review from February 14, 2024 to February 27, 2024 reviewed.  Average glucose is 192 mg per DL, GMI is 7.9%, glucose variability is 32.9%.  42% blood sugars are in target range, 1% blood sugars are low, 1% blood sugars are very low, 39% blood sugars are high, 17% blood sugars are very high.  Patient has variable blood sugars, anywhere from 60 to 200 mg per DL in the morning before breakfast, usually blood sugars are elevated after lunch and dinner and sometime low blood sugars after breakfast   Lab Results   Component Value Date    HGBA1C 7.2 (H) 02/13/2024     Component      Latest Ref Rng 1/17/2024   EXT Creatinine Urine      Reference range not established. mg/dL 144.1    Albumin,U,Random      <20.0 mg/L 41.3 (H)    Albumin Creat Ratio      0 - 30 mg/g creatinine 29       Legend:  (H) High  Component      Latest Ref Rng 2/15/2024   GLUCOSE      65 - 99 mg/dL 140 (H) (E)   BUN      7 - 28 mg/dL 16 (E)   Creatinine      0.53 - 1.30 mg/dL 0.92 (E)   Sodium      135 - 145 mmol/L 140 (E)   Potassium      3.5 - 5.2 mmol/L 3.9 (E)   Chloride      100 - 109 mmol/L 105 (E)   Carbon Dioxide      21 - 31 mmol/L 28 (E)   Calcium      8.5 - 10.1 mg/dL 9.6 (E)   ANION GAP      3 - 11  7 (E)   GFR, Calculated      >59  85 (E)   eGFR Comment Interpretive information: calculated GFR (E)     Component      Latest Ref Rng 2/14/2024   TSH, POC      0.45 - 5.33 uIU/mL 0.74 (E)          Review of Systems   Constitutional:  Positive for activity change and fatigue. Negative for diaphoresis, fever and unexpected weight change.   HENT: Negative.     Eyes:  Negative for visual disturbance.   Respiratory:  Negative for cough, chest tightness and shortness of breath.    Cardiovascular:  Negative for chest pain, palpitations and leg swelling.   Gastrointestinal:  Negative for abdominal pain, blood in stool, constipation, diarrhea, nausea and vomiting.   Endocrine:  Negative for cold intolerance, heat intolerance, polydipsia, polyphagia and polyuria.   Genitourinary:  Negative for dysuria, enuresis, frequency and urgency.   Musculoskeletal:  Negative for arthralgias and myalgias.   Skin:  Negative for pallor, rash and wound.   Allergic/Immunologic: Negative.    Neurological:  Negative for dizziness, tremors, weakness and numbness.   Hematological: Negative.    Psychiatric/Behavioral: Negative.         Historical Information   Past Medical History:   Diagnosis Date    Benign essential hypertension     Diabetes mellitus (HCC)     Hyperlipidemia     Hypoglycemia     LBBB (left bundle branch block)      History reviewed. No pertinent surgical history.  Social History   Social History     Substance and Sexual Activity   Alcohol Use Not Currently     Social History     Substance and Sexual Activity   Drug Use Not Currently     Social History     Tobacco Use   Smoking Status Former    Current packs/day: 0.00    Types: Cigarettes    Quit date:     Years since quittin.1   Smokeless Tobacco Never     Family History:   Family History   Problem Relation Age of Onset    Diabetes unspecified Father     Diabetes unspecified Brother        Meds/Allergies   Current Outpatient Medications   Medication Sig Dispense Refill    amLODIPine (NORVASC) 5 mg tablet Take 5 mg by mouth daily       ammonium lactate (LAC-HYDRIN) 12 % lotion       aspirin 81 MG tablet Take 1 tablet by mouth daily      brimonidine tartrate 0.2 % ophthalmic solution       Cholecalciferol 2000 units CAPS Take 2,000 Units by mouth every other day       citalopram (CeleXA) 40 mg tablet Take 20 mg by mouth daily       Continuous Blood Gluc  (FreeStyle Rosalinda 14 Day [place]) JIMMY Use 1 Device every 14 (fourteen) days 1 each 0    Continuous Blood Gluc Sensor (FreeStyle Rosalinda 14 Day Sensor) MISC Use 1 Device every 14 (fourteen) days 6 each 1    cyanocobalamin (VITAMIN B-12) 500 MCG tablet Vitamin B-12 TABS  TAKE 1  TABLET DAILY.    Refills: 0       Active      gabapentin, once-daily, (GRALISE) 300 MG tablet Take 600 mg by mouth daily at bedtime       glucose 4 g chewable tablet Chew 4 tablets (16 g total) as needed for low blood sugar 160 tablet 0    insulin aspart (NovoLOG) 100 Units/mL injection pen Inject 5 units before breakfast and lunch and 7 units before dinner +scale ( do not take insulin if blood sugar < 90 mg/dl)      Insulin Glargine Solostar (Lantus SoloStar) 100 UNIT/ML SOPN inject 12 unit subcutaneously every morning      Insulin Pen Needle 31G X 8 MM MISC Use 4 (four) times a day 400 each 1    lisinopril-hydrochlorothiazide (PRINZIDE,ZESTORETIC) 10-12.5 MG per tablet Take 1 tablet by mouth daily      melatonin 3 mg Take 6 mg by mouth      pravastatin (PRAVACHOL) 80 mg tablet Take 80 mg by mouth daily      traZODone (DESYREL) 100 mg tablet Take 100 mg by mouth daily at bedtime      busPIRone (BUSPAR) 5 mg tablet Take 5 mg by mouth 2 (two) times a day (Patient not taking: Reported on 12/1/2022)      LORazepam (ATIVAN) 0.5 mg tablet TAKE 1 TABLET (0.5 MG TOTAL)BY MOUTH ONE TIME FOR 1 DOSE, 30 MINUTES BEFORE THE MRI       No current facility-administered medications for this visit.     No Known Allergies    Objective   Vitals: Blood pressure 124/86, pulse 64, weight 79.5 kg (175 lb 3.2 oz), SpO2 96%.    Physical Exam  Vitals reviewed.   Constitutional:       General: He is not in acute distress.     Appearance: Normal appearance. He is not ill-appearing.   HENT:      Head: Normocephalic and atraumatic.      Nose: Nose normal.   Eyes:      Extraocular Movements: Extraocular movements intact.      Conjunctiva/sclera: Conjunctivae normal.   Pulmonary:      Effort: No respiratory distress.   Musculoskeletal:      Cervical back: Normal range of motion.   Neurological:      General: No focal deficit present.      Mental Status: He is alert and oriented to person, place, and time.   Psychiatric:         Mood and Affect:  "Mood normal.         Behavior: Behavior normal.         The history was obtained from the review of the chart, patient.    Lab Results:   Lab Results   Component Value Date/Time    Hemoglobin A1C 7.2 (H) 02/13/2024 02:54 PM    Hemoglobin A1C 7.4 (H) 01/17/2024 08:28 AM    Hemoglobin A1C 6.6 (A) 09/20/2023 11:52 AM    Hemoglobin A1C 7.3 (H) 04/14/2023 10:05 AM    Hemoglobin 274 05/10/2023 10:47 AM    BUN 16 02/15/2024 03:04 AM    BUN 18 02/14/2024 03:52 AM    BUN 22 02/13/2024 02:06 PM    Potassium 3.9 02/15/2024 03:04 AM    Potassium 3.8 02/14/2024 03:52 AM    Potassium 3.9 02/13/2024 02:06 PM    Chloride 105 02/15/2024 03:04 AM    Chloride 105 02/14/2024 03:52 AM    Chloride 105 02/13/2024 02:06 PM    Carbon Dioxide 28 02/15/2024 03:04 AM    Carbon Dioxide 27 02/14/2024 03:52 AM    Carbon Dioxide 31 02/13/2024 02:06 PM    Creatinine 0.92 02/15/2024 03:04 AM    Creatinine 0.82 02/14/2024 03:52 AM    Creatinine 1.04 02/13/2024 02:06 PM    AST 16 02/13/2024 02:06 PM    ALT 13 02/13/2024 02:06 PM    Protein, Total 7.5 02/13/2024 02:06 PM    ALBUMIN 4.7 02/13/2024 02:06 PM           Imaging Studies: I have personally reviewed pertinent reports.      Portions of the record may have been created with voice recognition software. Occasional wrong word or \"sound a like\" substitutions may have occurred due to the inherent limitations of voice recognition software. Read the chart carefully and recognize, using context, where substitutions have occurred.    "

## 2024-04-30 ENCOUNTER — APPOINTMENT (OUTPATIENT)
Dept: LAB | Facility: AMBULARY SURGERY CENTER | Age: 79
End: 2024-04-30
Payer: MEDICARE

## 2024-04-30 DIAGNOSIS — R20.8 BURNING SENSATION: ICD-10-CM

## 2024-04-30 DIAGNOSIS — I10 ESSENTIAL HYPERTENSION, MALIGNANT: ICD-10-CM

## 2024-04-30 DIAGNOSIS — R35.0 URINARY FREQUENCY: ICD-10-CM

## 2024-04-30 LAB
BACTERIA UR QL AUTO: ABNORMAL /HPF
BASOPHILS # BLD AUTO: 0.01 THOUSANDS/ÂΜL (ref 0–0.1)
BASOPHILS NFR BLD AUTO: 0 % (ref 0–1)
BILIRUB UR QL STRIP: NEGATIVE
CLARITY UR: CLEAR
COLOR UR: ABNORMAL
EOSINOPHIL # BLD AUTO: 0.18 THOUSAND/ÂΜL (ref 0–0.61)
EOSINOPHIL NFR BLD AUTO: 5 % (ref 0–6)
ERYTHROCYTE [DISTWIDTH] IN BLOOD BY AUTOMATED COUNT: 13.2 % (ref 11.6–15.1)
GLUCOSE UR STRIP-MCNC: NEGATIVE MG/DL
HCT VFR BLD AUTO: 37.9 % (ref 36.5–49.3)
HGB BLD-MCNC: 11.8 G/DL (ref 12–17)
HGB UR QL STRIP.AUTO: ABNORMAL
IMM GRANULOCYTES # BLD AUTO: 0.02 THOUSAND/UL (ref 0–0.2)
IMM GRANULOCYTES NFR BLD AUTO: 1 % (ref 0–2)
KETONES UR STRIP-MCNC: NEGATIVE MG/DL
LEUKOCYTE ESTERASE UR QL STRIP: NEGATIVE
LYMPHOCYTES # BLD AUTO: 0.65 THOUSANDS/ÂΜL (ref 0.6–4.47)
LYMPHOCYTES NFR BLD AUTO: 17 % (ref 14–44)
MCH RBC QN AUTO: 29.5 PG (ref 26.8–34.3)
MCHC RBC AUTO-ENTMCNC: 31.1 G/DL (ref 31.4–37.4)
MCV RBC AUTO: 95 FL (ref 82–98)
MONOCYTES # BLD AUTO: 0.51 THOUSAND/ÂΜL (ref 0.17–1.22)
MONOCYTES NFR BLD AUTO: 14 % (ref 4–12)
NEUTROPHILS # BLD AUTO: 2.36 THOUSANDS/ÂΜL (ref 1.85–7.62)
NEUTS SEG NFR BLD AUTO: 63 % (ref 43–75)
NITRITE UR QL STRIP: NEGATIVE
NON-SQ EPI CELLS URNS QL MICRO: ABNORMAL /HPF
NRBC BLD AUTO-RTO: 0 /100 WBCS
PH UR STRIP.AUTO: 6 [PH]
PLATELET # BLD AUTO: 134 THOUSANDS/UL (ref 149–390)
PMV BLD AUTO: 12.4 FL (ref 8.9–12.7)
PROT UR STRIP-MCNC: ABNORMAL MG/DL
RBC # BLD AUTO: 4 MILLION/UL (ref 3.88–5.62)
RBC #/AREA URNS AUTO: ABNORMAL /HPF
SP GR UR STRIP.AUTO: 1.02 (ref 1–1.03)
UROBILINOGEN UR STRIP-ACNC: <2 MG/DL
WBC # BLD AUTO: 3.73 THOUSAND/UL (ref 4.31–10.16)
WBC #/AREA URNS AUTO: ABNORMAL /HPF

## 2024-04-30 PROCEDURE — 85025 COMPLETE CBC W/AUTO DIFF WBC: CPT

## 2024-04-30 PROCEDURE — 36415 COLL VENOUS BLD VENIPUNCTURE: CPT

## 2024-04-30 PROCEDURE — 80053 COMPREHEN METABOLIC PANEL: CPT

## 2024-04-30 PROCEDURE — 81001 URINALYSIS AUTO W/SCOPE: CPT

## 2024-05-08 LAB
ALBUMIN SERPL BCP-MCNC: 4 G/DL (ref 3.5–5)
ALP SERPL-CCNC: 109 U/L (ref 34–104)
ALT SERPL W P-5'-P-CCNC: 15 U/L (ref 7–52)
ANION GAP SERPL CALCULATED.3IONS-SCNC: 5 MMOL/L (ref 4–13)
AST SERPL W P-5'-P-CCNC: 18 U/L (ref 13–39)
BILIRUB SERPL-MCNC: 0.71 MG/DL (ref 0.2–1)
BUN SERPL-MCNC: 20 MG/DL (ref 5–25)
CALCIUM SERPL-MCNC: 9.3 MG/DL (ref 8.4–10.2)
CHLORIDE SERPL-SCNC: 103 MMOL/L (ref 96–108)
CO2 SERPL-SCNC: 31 MMOL/L (ref 21–32)
CREAT SERPL-MCNC: 1.17 MG/DL (ref 0.6–1.3)
GFR SERPL CREATININE-BSD FRML MDRD: 59 ML/MIN/1.73SQ M
GLUCOSE P FAST SERPL-MCNC: 183 MG/DL (ref 65–99)
POTASSIUM SERPL-SCNC: 4.3 MMOL/L (ref 3.5–5.3)
PROT SERPL-MCNC: 6.9 G/DL (ref 6.4–8.4)
SODIUM SERPL-SCNC: 139 MMOL/L (ref 135–147)

## 2024-05-14 ENCOUNTER — FOLLOW UP (OUTPATIENT)
Dept: URBAN - METROPOLITAN AREA CLINIC 6 | Facility: CLINIC | Age: 79
End: 2024-05-14

## 2024-05-14 DIAGNOSIS — E11.3593: ICD-10-CM

## 2024-05-14 DIAGNOSIS — H40.1132: ICD-10-CM

## 2024-05-14 DIAGNOSIS — Z96.1: ICD-10-CM

## 2024-05-14 DIAGNOSIS — Z79.4: ICD-10-CM

## 2024-05-14 DIAGNOSIS — H25.812: ICD-10-CM

## 2024-05-14 PROCEDURE — 92133 CPTRZD OPH DX IMG PST SGM ON: CPT

## 2024-05-14 PROCEDURE — 92202 OPSCPY EXTND ON/MAC DRAW: CPT

## 2024-05-14 PROCEDURE — 92014 COMPRE OPH EXAM EST PT 1/>: CPT

## 2024-05-14 PROCEDURE — 92020 GONIOSCOPY: CPT

## 2024-05-14 ASSESSMENT — VISUAL ACUITY
OD_CC: 20/30-1
OU_CC: 20/30-2
OS_CC: 20/30-2

## 2024-05-14 ASSESSMENT — TONOMETRY
OD_IOP_MMHG: 14
OS_IOP_MMHG: 14

## 2024-05-30 ENCOUNTER — TELEPHONE (OUTPATIENT)
Dept: ENDOCRINOLOGY | Facility: CLINIC | Age: 79
End: 2024-05-30

## 2024-05-30 NOTE — TELEPHONE ENCOUNTER
Reviewed blood sugar log, blood sugars are sometimes dropping during the day   Fasting blood sugars are elevated     please inform patient to continue Lantus 12 units in the morning  Please inform patient to reduce NovoLog to 3 units before breakfast, and lunch and take 5 units before dinner.  Please update the med list.  He should send the log again in 2 weeks

## 2024-06-03 DIAGNOSIS — E10.65 TYPE 1 DIABETES MELLITUS WITH HYPERGLYCEMIA (HCC): ICD-10-CM

## 2024-06-03 NOTE — TELEPHONE ENCOUNTER
Discussed with pt, about the providers recommendations   Changes made to medlist/ labs ordered   Pt verbalized understanding.

## 2024-06-17 ENCOUNTER — TELEPHONE (OUTPATIENT)
Dept: ENDOCRINOLOGY | Facility: CLINIC | Age: 79
End: 2024-06-17

## 2024-06-18 NOTE — TELEPHONE ENCOUNTER
BG levels are elevated before meals.     If currently taking Novolog 3-3-2 units TID with meals, then increase to 3-4-3 units TID with meals. Continue current dose of Lantus. Bring log to upcoming appointment.

## 2024-06-18 NOTE — TELEPHONE ENCOUNTER
Spoke to wife she verbalized she understood. Pt was doing Novolog 3-3-2 but will now do 3-4-3 units.

## 2024-06-25 ENCOUNTER — LAB (OUTPATIENT)
Dept: LAB | Facility: AMBULARY SURGERY CENTER | Age: 79
End: 2024-06-25
Payer: MEDICARE

## 2024-06-25 DIAGNOSIS — E10.65 TYPE 1 DIABETES MELLITUS WITH HYPERGLYCEMIA (HCC): ICD-10-CM

## 2024-06-25 LAB
ANION GAP SERPL CALCULATED.3IONS-SCNC: 9 MMOL/L (ref 4–13)
BUN SERPL-MCNC: 25 MG/DL (ref 5–25)
CALCIUM SERPL-MCNC: 9.8 MG/DL (ref 8.4–10.2)
CHLORIDE SERPL-SCNC: 100 MMOL/L (ref 96–108)
CO2 SERPL-SCNC: 30 MMOL/L (ref 21–32)
CREAT SERPL-MCNC: 1.24 MG/DL (ref 0.6–1.3)
EST. AVERAGE GLUCOSE BLD GHB EST-MCNC: 252 MG/DL
GFR SERPL CREATININE-BSD FRML MDRD: 55 ML/MIN/1.73SQ M
GLUCOSE P FAST SERPL-MCNC: 241 MG/DL (ref 65–99)
HBA1C MFR BLD: 10.4 %
POTASSIUM SERPL-SCNC: 4.4 MMOL/L (ref 3.5–5.3)
SODIUM SERPL-SCNC: 139 MMOL/L (ref 135–147)

## 2024-06-25 PROCEDURE — 36415 COLL VENOUS BLD VENIPUNCTURE: CPT

## 2024-06-25 PROCEDURE — 80048 BASIC METABOLIC PNL TOTAL CA: CPT

## 2024-06-25 PROCEDURE — 83036 HEMOGLOBIN GLYCOSYLATED A1C: CPT

## 2024-06-25 PROCEDURE — 84681 ASSAY OF C-PEPTIDE: CPT

## 2024-06-26 LAB — C PEPTIDE SERPL-MCNC: 0.6 NG/ML (ref 1.1–4.4)

## 2024-07-09 ENCOUNTER — OFFICE VISIT (OUTPATIENT)
Dept: ENDOCRINOLOGY | Facility: CLINIC | Age: 79
End: 2024-07-09
Payer: MEDICARE

## 2024-07-09 VITALS
BODY MASS INDEX: 21.97 KG/M2 | SYSTOLIC BLOOD PRESSURE: 130 MMHG | OXYGEN SATURATION: 98 % | HEART RATE: 57 BPM | HEIGHT: 74 IN | WEIGHT: 171.2 LBS | DIASTOLIC BLOOD PRESSURE: 60 MMHG

## 2024-07-09 DIAGNOSIS — E78.2 MIXED HYPERLIPIDEMIA: ICD-10-CM

## 2024-07-09 DIAGNOSIS — E10.65 TYPE 1 DIABETES MELLITUS WITH HYPERGLYCEMIA (HCC): Primary | ICD-10-CM

## 2024-07-09 DIAGNOSIS — N18.31 STAGE 3A CHRONIC KIDNEY DISEASE (HCC): ICD-10-CM

## 2024-07-09 DIAGNOSIS — I10 ESSENTIAL HYPERTENSION: ICD-10-CM

## 2024-07-09 PROCEDURE — 99215 OFFICE O/P EST HI 40 MIN: CPT | Performed by: INTERNAL MEDICINE

## 2024-07-09 PROCEDURE — 95251 CONT GLUC MNTR ANALYSIS I&R: CPT | Performed by: INTERNAL MEDICINE

## 2024-07-09 RX ORDER — GLUCAGON 3 MG/1
POWDER NASAL
Qty: 2 EACH | Refills: 5 | Status: SHIPPED | OUTPATIENT
Start: 2024-07-09

## 2024-07-09 NOTE — PATIENT INSTRUCTIONS
INSULIN DOSAGE INSTRUCTIONS    Name: Ibrahima De Santiago                        : 1945  MRN #: [id number]    Your Current Insulin  and dose is: Before Breakfast Before Lunch Before Evening Meal Bedtime     Novolog Insulin 3 units      3 units  3 units     Regular, Apidra, Humalog orNovolog Sliding Scale:   <80              151-200 + 0 +0 +0    201-250 + 0 +0 +0 +   251-300 + 0 +0 +0 +   301-350 + 1 +1 +1 +   >350 + 2 +2 +2 +1       Lantus Insulin   12  units         Additional Instructions:   Please test your blood sugar:  _4_ Times per day.  X_ Before Breakfast                _ Alternate Testing  X_ Before Lunch                _ 2 Hours After  Meal  X_ Before Evening Meal               _ 3 a.m.  x_ Before Bedtime Snack     Target Blood sugar range _70_to _140__.  Call if your Michael Pierre MD  blood sugar is less than _60_ or greater than _400__.    Today's Date: 2024       Hypoglycemia instructions   Ibrahima De Santiago  2024  [id number]    Low Blood Sugar    Steps to treat low blood sugar.    1. Test blood sugar if you have symptoms of low blood sugar:   Low Blood Sugar Symptoms:  o Sweaty  o Dizzy  o Rapid heartbeat  o Shaky    o Bad mood  o Hungry      2. Treat blood sugar less than 70 with 15 grams of fast-acting carbohydrate:   Examples of 15 grams Fast-Acting Carbohydrate:  o 4 oz juice  o 4 oz regular soda  o 3-4 glucose tablets (chew)  o 3-4 hard candies (chew)              3.   Wait 15 minutes and test your blood sugar again           4. If blood sugar is less than 100, repeat steps 2-3.      5. When your blood sugar is 100 or more, eat a snack if it will be longer than one hour until your next meal. The snack should be 15 grams of carbohydrate and a protein:   Examples of snacks:  o ½ sandwich  o 6 crackers with cheese  o Piece of fruit with cheese or peanut butter  o 6 crackers with peanut butter

## 2024-07-09 NOTE — PROGRESS NOTES
Ibrahima De Santiago 78 y.o. male MRN: [id number]    Encounter: [id number]      Assessment & Plan     Assessment:  This is a 78 y.o.-year-old male with diabetes with hyperglycemia.    Plan:  Diagnoses and all orders for this visit:    Type 1 diabetes mellitus with hyperglycemia (HCC)  Lab Results   Component Value Date    HGBA1C 10.4 (H) 2024   A1c is 10.4%, uncontrolled.  As per continuous glucose monitor sensor patient does not have severe hypoglycemic episode less than 60 mg per DL.  Blood sugars usually drop in 60 range after taking bolus.  Discussed with patient to take NovoLog at least 10 to 15 minutes before eating meal to avoid spiking blood sugar.  Correction scale was provided to patient    Patient did not have any recent admission for hypoglycemia.  Emergency prescription for Baqsimi, glucagon was prescribed in case of severe hypoglycemia and unconsciousness..    INSULIN DOSAGE INSTRUCTIONS    Name: Ibrahima De Santiago                        : 1945  MRN #: [id number]    Your Current Insulin  and dose is: Before Breakfast Before Lunch Before Evening Meal Bedtime     Novolog Insulin 3 units      3 units  3 units     Regular, Apidra, Humalog orNovolog Sliding Scale:   <80              151-200 + 0 +0 +0    201-250 + 0 +0 +0 +   251-300 + 0 +0 +0 +   301-350 + 1 +1 +1 +   >350 + 2 +2 +2 +1       Lantus Insulin   12  units         Additional Instructions:   Please test your blood sugar:  _4_ Times per day.  X_ Before Breakfast                _ Alternate Testing  X_ Before Lunch                _ 2 Hours After  Meal  X_ Before Evening Meal               _ 3 a.m.  x_ Before Bedtime Snack     Target Blood sugar range _80 to 200 mg per DL__.    Educated about hypoglycemia symptoms and treatment  -     insulin aspart (NovoLOG) 100 Units/mL injection pen; 3 units before breakfast, and lunch and take 3 units before dinner+ scale  -     Glucagon (Baqsimi Two Pack) 3 MG/DOSE POWD; Use 1 spray in one  nostril in case of severe hypoglycemia and unconscious  -     Basic metabolic panel; Future  -     Hemoglobin A1C; Future    Mixed hyperlipidemia  Lab Results   Component Value Date    LDLCALC 63 12/21/2022      Continue statins, LDL is at goal  Stage 3a chronic kidney disease (HCC)    Stable, will continue to monitor  Essential hypertension  Blood pressure well-controlled, continue current management       I have spent a total time of 40 minutes in caring for this patient on the day of the visit/encounter including Diagnostic results, Prognosis, Risks and benefits of tx options, Instructions for management, Patient and family education, Importance of tx compliance, Risk factor reductions, Impressions, Counseling / Coordination of care, Documenting in the medical record, Reviewing / ordering tests, medicine, procedures  , and Obtaining or reviewing history  .   CC: Diabetes    History of Present Illness     HPI:  Ibrahima De Santiago is 78-year-old male with medical history of type 1 diabetes with stage III CKD, left bundle branch block, hypertension, hyperlipidemia is here for follow-up.  He denies any recent hospitalization for hyperglycemia or hypoglycemia.  Diabetes course has been stable recently.  Last A1c is 10.4%.  Current regimen includes Lantus 12 units in the morning and NovoLog 3-3-5 units with meals, denies missing doses.  He admits to taking short acting insulin after meals causing drop in blood sugars.  He uses continuous glucose monitor sensor by an chas.  2 weeks overview from June 26 to July 9, 2024 reviewed.  More than 72 hours of data reviewed.  His glucose is 211 mg per DL, GMI is 8.4%, glucose variability is 32.9%  31% blood sugars are in target range, 1% blood sugars are low, 0% blood sugars are very low, 38% blood sugars are high, 30% blood sugars are very high, 30% blood sugars are very high  Pt  has a pattern of elevated blood sugars usually after dinner and again in the morning followed by  low blood sugars after taking short acting insulin    He is accompanied by his wife, as per wife he is forgetful sometimes and then when blood sugar goes to 400 and he gets alarm he takes a short acting insulin causing hypoglycemia.    For hypertension, he takes Norvasc 5 mg daily  He takes vitamin B12 supplementation 500 mcg daily  For hyperlipidemia he takes Pravachol 80 mg daily    Lab Results   Component Value Date    HGBA1C 10.4 (H) 06/25/2024     Component      Latest Ref Rng 4/30/2024 6/25/2024   WBC      4.31 - 10.16 Thousand/uL 3.73 (L)     RBC      3.88 - 5.62 Million/uL 4.00     Hemoglobin      12.0 - 17.0 g/dL 11.8 (L)     Hematocrit      36.5 - 49.3 % 37.9     MCV      82 - 98 fL 95     MCH      26.8 - 34.3 pg 29.5     MCHC      31.4 - 37.4 g/dL 31.1 (L)     RDW      11.6 - 15.1 % 13.2     MPV      8.9 - 12.7 fL 12.4     Platelet Count      149 - 390 Thousands/uL 134 (L)     nRBC      /100 WBCs 0     Segmented %      43 - 75 % 63     Immature Grans %      0 - 2 % 1     Lymphocytes %      14 - 44 % 17     Monocytes %      4 - 12 % 14 (H)     Eosinophils %      0 - 6 % 5     Basophils %      0 - 1 % 0     Absolute Neutrophils      1.85 - 7.62 Thousands/µL 2.36     Absolute Immature Grans      0.00 - 0.20 Thousand/uL 0.02     Absolute Lymphocytes      0.60 - 4.47 Thousands/µL 0.65     Absolute Monocytes      0.17 - 1.22 Thousand/µL 0.51     Absolute Eosinophils      0.00 - 0.61 Thousand/µL 0.18     Absolute Basophils      0.00 - 0.10 Thousands/µL 0.01     Sodium      135 - 147 mmol/L 139  139    Potassium      3.5 - 5.3 mmol/L 4.3  4.4    Chloride      96 - 108 mmol/L 103  100    Carbon Dioxide      21 - 32 mmol/L 31  30    ANION GAP      4 - 13 mmol/L 5  9    BUN      5 - 25 mg/dL 20  25    Creatinine      0.60 - 1.30 mg/dL 1.17  1.24    GLUCOSE, FASTING      65 - 99 mg/dL 183 (H)  241 (H)    Calcium      8.4 - 10.2 mg/dL 9.3  9.8    AST      13 - 39 U/L 18     ALT      7 - 52 U/L 15     ALK PHOS      34  - 104 U/L 109 (H)     Total Protein      6.4 - 8.4 g/dL 6.9     Albumin      3.5 - 5.0 g/dL 4.0     Total Bilirubin      0.20 - 1.00 mg/dL 0.71     GFR, Calculated      ml/min/1.73sq m 59  55    Color, UA Light Yellow     Clarity, UA Clear     SL AMB SPECIFIC GRAVITY_URINE      1.003 - 1.030  1.024     pH, UA      4.5, 5.0, 5.5, 6.0, 6.5, 7.0, 7.5, 8.0  6.0     Leukocytes, UA      Negative  Negative     Nitrite, UA      Negative  Negative     POCT URINE PROTEIN      Negative mg/dl 50 (1+) !     Glucose, UA      Negative mg/dl Negative     Ketones, UA      Negative mg/dl Negative     Urobilinogen, UA      <2.0 mg/dl mg/dl <2.0     Bilirubin Urine      Negative  Negative     Blood, UA      Negative  Small !     RBC Urine      None Seen, 1-2 /hpf None Seen     WBC, UA      None Seen, 1-2 /hpf 2-4 !     Epithelial Cells      None Seen, Occasional /hpf None Seen     Bacteria, UA      None Seen, Occasional /hpf None Seen     Hemoglobin A1C      Normal 4.0-5.6%; PreDiabetic 5.7-6.4%; Diabetic >=6.5%; Glycemic control for adults with diabetes <7.0% %  10.4 (H)    eAG, EST AVG Glucose      mg/dl  252    C-PEPTIDE      1.1 - 4.4 ng/mL  0.6 (L)            Review of Systems   Constitutional:  Positive for activity change and fatigue. Negative for diaphoresis, fever and unexpected weight change.   HENT: Negative.     Eyes:  Negative for visual disturbance.   Respiratory:  Negative for cough, chest tightness and shortness of breath.    Cardiovascular:  Negative for chest pain, palpitations and leg swelling.   Gastrointestinal:  Negative for abdominal pain, blood in stool, constipation, diarrhea, nausea and vomiting.   Endocrine: Negative for cold intolerance, heat intolerance, polydipsia, polyphagia and polyuria.   Genitourinary:  Positive for frequency and urgency. Negative for dysuria and enuresis.   Musculoskeletal:  Negative for arthralgias and myalgias.   Skin:  Negative for pallor, rash and wound.   Allergic/Immunologic:  Negative.    Neurological:  Positive for numbness. Negative for dizziness, tremors and weakness.   Hematological: Negative.    Psychiatric/Behavioral: Negative.         Historical Information   Past Medical History:   Diagnosis Date    Benign essential hypertension     Diabetes mellitus (HCC)     Hyperlipidemia     Hypoglycemia     LBBB (left bundle branch block)      History reviewed. No pertinent surgical history.  Social History   Social History     Substance and Sexual Activity   Alcohol Use Not Currently     Social History     Substance and Sexual Activity   Drug Use Not Currently     Social History     Tobacco Use   Smoking Status Former    Current packs/day: 0.00    Types: Cigarettes    Quit date:     Years since quittin.5   Smokeless Tobacco Never     Family History:   Family History   Problem Relation Age of Onset    Diabetes unspecified Father     Diabetes unspecified Brother        Meds/Allergies   Current Outpatient Medications   Medication Sig Dispense Refill    amLODIPine (NORVASC) 5 mg tablet Take 5 mg by mouth daily       ammonium lactate (LAC-HYDRIN) 12 % lotion       aspirin 81 MG tablet Take 1 tablet by mouth daily      brimonidine tartrate 0.2 % ophthalmic solution       Cholecalciferol 2000 units CAPS Take 2,000 Units by mouth every other day       citalopram (CeleXA) 40 mg tablet Take 20 mg by mouth daily       Continuous Blood Gluc  (FreeStyle Rosalinda 14 Day [place]) JIMMY Use 1 Device every 14 (fourteen) days 1 each 0    Continuous Blood Gluc Sensor (FreeStyle Rosalinda 14 Day Sensor) MISC Use 1 Device every 14 (fourteen) days 6 each 1    cyanocobalamin (VITAMIN B-12) 500 MCG tablet Take one tablet daily 30 tablet 5    gabapentin, once-daily, (GRALISE) 300 MG tablet Take 600 mg by mouth daily at bedtime       Glucagon (Baqsimi Two Pack) 3 MG/DOSE POWD Use 1 spray in one nostril in case of severe hypoglycemia and unconscious 2 each 5    glucose 4 g chewable tablet Chew 4 tablets (16 g  "total) as needed for low blood sugar 160 tablet 0    insulin aspart (NovoLOG) 100 Units/mL injection pen 3 units before breakfast, and lunch and take 3 units before dinner+ scale      Insulin Glargine Solostar (Lantus SoloStar) 100 UNIT/ML SOPN inject 12 unit subcutaneously every morning      Insulin Pen Needle 31G X 8 MM MISC Use 4 (four) times a day 400 each 1    lisinopril-hydrochlorothiazide (PRINZIDE,ZESTORETIC) 10-12.5 MG per tablet Take 1 tablet by mouth daily      LORazepam (ATIVAN) 0.5 mg tablet TAKE 1 TABLET (0.5 MG TOTAL)BY MOUTH ONE TIME FOR 1 DOSE, 30 MINUTES BEFORE THE MRI      melatonin 3 mg Take 6 mg by mouth      pravastatin (PRAVACHOL) 80 mg tablet Take 80 mg by mouth daily      traZODone (DESYREL) 100 mg tablet Take 100 mg by mouth daily at bedtime      busPIRone (BUSPAR) 5 mg tablet Take 5 mg by mouth 2 (two) times a day (Patient not taking: Reported on 12/1/2022)       No current facility-administered medications for this visit.     No Known Allergies    Objective   Vitals: Blood pressure 130/60, pulse 57, height 6' 2\" (1.88 m), weight 77.7 kg (171 lb 3.2 oz), SpO2 98%.    Physical Exam  Vitals reviewed.   Constitutional:       General: He is not in acute distress.     Appearance: Normal appearance. He is well-developed.   HENT:      Head: Normocephalic and atraumatic.   Eyes:      Pupils: Pupils are equal, round, and reactive to light.   Neck:      Thyroid: No thyromegaly.   Cardiovascular:      Rate and Rhythm: Normal rate and regular rhythm.      Heart sounds: Normal heart sounds.   Pulmonary:      Effort: Pulmonary effort is normal. No respiratory distress.      Breath sounds: Normal breath sounds.   Musculoskeletal:         General: No deformity. Normal range of motion.      Cervical back: Normal range of motion and neck supple.   Skin:     General: Skin is warm and dry.      Findings: No erythema.   Neurological:      Mental Status: He is alert and oriented to person, place, and time. " "        The history was obtained from the review of the chart, patient.    Lab Results:   Lab Results   Component Value Date/Time    Hemoglobin A1C 10.4 (H) 06/25/2024 08:18 AM    Hemoglobin A1C 7.7 (H) 03/15/2024 10:10 AM    Hemoglobin A1C 7.2 (H) 02/13/2024 02:54 PM    Hemoglobin A1C 7.4 (H) 01/17/2024 08:28 AM    Hemoglobin A1C 6.6 (A) 09/20/2023 11:52 AM    WBC 3.73 (L) 04/30/2024 08:29 AM    Hemoglobin 11.8 (L) 04/30/2024 08:29 AM    Hematocrit 37.9 04/30/2024 08:29 AM    MCV 95 04/30/2024 08:29 AM    Platelets 134 (L) 04/30/2024 08:29 AM    BUN 25 06/25/2024 08:18 AM    BUN 20 04/30/2024 08:29 AM    BUN 16 02/15/2024 03:04 AM    BUN 18 02/14/2024 03:52 AM    BUN 22 02/13/2024 02:06 PM    Potassium 4.4 06/25/2024 08:18 AM    Potassium 4.3 04/30/2024 08:29 AM    Potassium 3.9 02/15/2024 03:04 AM    Potassium 3.8 02/14/2024 03:52 AM    Potassium 3.9 02/13/2024 02:06 PM    Chloride 100 06/25/2024 08:18 AM    Chloride 103 04/30/2024 08:29 AM    Chloride 105 02/15/2024 03:04 AM    Chloride 105 02/14/2024 03:52 AM    Chloride 105 02/13/2024 02:06 PM    Carbon Dioxide 28 02/15/2024 03:04 AM    Carbon Dioxide 27 02/14/2024 03:52 AM    Carbon Dioxide 31 02/13/2024 02:06 PM    CO2 30 06/25/2024 08:18 AM    CO2 31 04/30/2024 08:29 AM    Creatinine 1.24 06/25/2024 08:18 AM    Creatinine 1.17 04/30/2024 08:29 AM    Creatinine 0.92 02/15/2024 03:04 AM    Creatinine 0.82 02/14/2024 03:52 AM    Creatinine 1.04 02/13/2024 02:06 PM    AST 18 04/30/2024 08:29 AM    AST 16 02/13/2024 02:06 PM    ALT 15 04/30/2024 08:29 AM    ALT 13 02/13/2024 02:06 PM    Total Protein 6.9 04/30/2024 08:29 AM    Protein, Total 7.5 02/13/2024 02:06 PM    Albumin 4.0 04/30/2024 08:29 AM    ALBUMIN 4.7 02/13/2024 02:06 PM           Imaging Studies: I have personally reviewed pertinent reports.      Portions of the record may have been created with voice recognition software. Occasional wrong word or \"sound a like\" substitutions may have occurred due " to the inherent limitations of voice recognition software. Read the chart carefully and recognize, using context, where substitutions have occurred.

## 2024-07-17 ENCOUNTER — TELEPHONE (OUTPATIENT)
Age: 79
End: 2024-07-17

## 2024-07-17 DIAGNOSIS — E11.65 TYPE 2 DIABETES MELLITUS WITH HYPERGLYCEMIA, WITH LONG-TERM CURRENT USE OF INSULIN (HCC): ICD-10-CM

## 2024-07-17 DIAGNOSIS — E10.65 TYPE 1 DIABETES MELLITUS WITH HYPERGLYCEMIA (HCC): Primary | ICD-10-CM

## 2024-07-17 DIAGNOSIS — Z79.4 TYPE 2 DIABETES MELLITUS WITH HYPERGLYCEMIA, WITH LONG-TERM CURRENT USE OF INSULIN (HCC): ICD-10-CM

## 2024-07-17 NOTE — TELEPHONE ENCOUNTER
"Patient's wife calling stating patient tried to put on his FreeStyle Rosalinda sensors last night and they \"all fell apart\"     She states the sensors themselves are coming apart.     Patient went through 3 sensors and states the same thing happened with all of them.     They are asking what to do now as he is out of sensors.     Patient asking if we have any samples or if we can do an override on his prescription as the pharmacy will not fill as it is too early.     Patient's wife asking for a call back asap  "

## 2024-07-18 NOTE — TELEPHONE ENCOUNTER
It looks like they need education on how to put sensors, as they are falling off so quickly which should not happen , we can give only one sensor to pt , also please make appt with CDE for education for sensor teaching     Satish Corley

## 2024-07-21 DIAGNOSIS — E10.65 TYPE 1 DIABETES MELLITUS WITH HYPERGLYCEMIA (HCC): ICD-10-CM

## 2024-07-21 RX ORDER — PEN NEEDLE, DIABETIC 31 GX5/16"
NEEDLE, DISPOSABLE MISCELLANEOUS
Qty: 100 EACH | Refills: 2 | Status: SHIPPED | OUTPATIENT
Start: 2024-07-21

## 2024-10-03 DIAGNOSIS — E10.65 TYPE 1 DIABETES MELLITUS WITH HYPERGLYCEMIA (HCC): ICD-10-CM

## 2024-10-03 RX ORDER — PEN NEEDLE, DIABETIC 31 GX5/16"
NEEDLE, DISPOSABLE MISCELLANEOUS
Qty: 100 EACH | Refills: 5 | Status: SHIPPED | OUTPATIENT
Start: 2024-10-03

## 2024-10-07 ENCOUNTER — LAB (OUTPATIENT)
Dept: LAB | Facility: AMBULARY SURGERY CENTER | Age: 79
End: 2024-10-07
Payer: MEDICARE

## 2024-10-07 DIAGNOSIS — E10.65 TYPE 1 DIABETES MELLITUS WITH HYPERGLYCEMIA (HCC): ICD-10-CM

## 2024-10-07 LAB
ANION GAP SERPL CALCULATED.3IONS-SCNC: 7 MMOL/L (ref 4–13)
BUN SERPL-MCNC: 19 MG/DL (ref 5–25)
CALCIUM SERPL-MCNC: 9.1 MG/DL (ref 8.4–10.2)
CHLORIDE SERPL-SCNC: 99 MMOL/L (ref 96–108)
CO2 SERPL-SCNC: 30 MMOL/L (ref 21–32)
CREAT SERPL-MCNC: 1.23 MG/DL (ref 0.6–1.3)
EST. AVERAGE GLUCOSE BLD GHB EST-MCNC: 246 MG/DL
GFR SERPL CREATININE-BSD FRML MDRD: 55 ML/MIN/1.73SQ M
GLUCOSE P FAST SERPL-MCNC: 314 MG/DL (ref 65–99)
HBA1C MFR BLD: 10.2 %
POTASSIUM SERPL-SCNC: 4.5 MMOL/L (ref 3.5–5.3)
SODIUM SERPL-SCNC: 136 MMOL/L (ref 135–147)

## 2024-10-07 PROCEDURE — 80048 BASIC METABOLIC PNL TOTAL CA: CPT

## 2024-10-07 PROCEDURE — 36415 COLL VENOUS BLD VENIPUNCTURE: CPT

## 2024-10-07 PROCEDURE — 83036 HEMOGLOBIN GLYCOSYLATED A1C: CPT

## 2024-10-18 ENCOUNTER — OFFICE VISIT (OUTPATIENT)
Dept: ENDOCRINOLOGY | Facility: CLINIC | Age: 79
End: 2024-10-18
Payer: MEDICARE

## 2024-10-18 VITALS
OXYGEN SATURATION: 98 % | SYSTOLIC BLOOD PRESSURE: 120 MMHG | BODY MASS INDEX: 23.28 KG/M2 | HEART RATE: 60 BPM | WEIGHT: 181.4 LBS | HEIGHT: 74 IN | DIASTOLIC BLOOD PRESSURE: 62 MMHG

## 2024-10-18 DIAGNOSIS — E78.2 MIXED HYPERLIPIDEMIA: ICD-10-CM

## 2024-10-18 DIAGNOSIS — I10 ESSENTIAL HYPERTENSION: Primary | ICD-10-CM

## 2024-10-18 DIAGNOSIS — E10.65 TYPE 1 DIABETES MELLITUS WITH HYPERGLYCEMIA (HCC): ICD-10-CM

## 2024-10-18 PROCEDURE — 95251 CONT GLUC MNTR ANALYSIS I&R: CPT

## 2024-10-18 PROCEDURE — 99214 OFFICE O/P EST MOD 30 MIN: CPT

## 2024-10-18 RX ORDER — GLUCAGON 3 MG/1
POWDER NASAL
Qty: 2 EACH | Refills: 5 | Status: SHIPPED | OUTPATIENT
Start: 2024-10-18 | End: 2024-10-23

## 2024-10-18 NOTE — ASSESSMENT & PLAN NOTE
A1c minimally improved from previous visit and remains above goal of 7.5%.    Increase NovoLog to 4 units before meals, plus scale.  Increase Lantus to 14 units daily.  Reviewed appropriate administration sites as well as injection technique- education and diagram provided in AVS.    Reviewed signs and symptoms of hypoglycemia as well as appropriate treatment.  Baqsimi sent to pharmacy.  Rule of 15's provided in AVS.  Continue freestyle chas 2 CGM.  Call the office for blood glucose levels less than 80 mg/dL or persistent patterns over 250 mg/dL    Improve diet and lifestyle.  Stop drinking juice.  Increase physical activity as tolerated.  Lab Results   Component Value Date    HGBA1C 10.2 (H) 10/07/2024       Orders:    Ambulatory referral to Podiatry    Hemoglobin A1C; Future    Basic metabolic panel; Future    Glucagon (Baqsimi Two Pack) 3 MG/DOSE POWD; Use 1 spray in one nostril in case of severe hypoglycemia and unconscious

## 2024-10-18 NOTE — PROGRESS NOTES
Ambulatory Visit  Name: Ibrahima De Santiago      : 1945      MRN: [id number]  Encounter Provider: SAMANTA Velasquez  Encounter Date: 10/18/2024   Encounter department: [facility] [place]    Assessment & Plan  Type 1 diabetes mellitus with hyperglycemia (HCC)  A1c minimally improved from previous visit and remains above goal of 7.5%.    Increase NovoLog to 4 units before meals, plus scale.  Increase Lantus to 14 units daily.  Reviewed appropriate administration sites as well as injection technique- education and diagram provided in AVS.    Reviewed signs and symptoms of hypoglycemia as well as appropriate treatment.  Baqsimi sent to pharmacy.  Rule of 15's provided in AVS.  Continue freestyle chas 2 CGM.  Call the office for blood glucose levels less than 80 mg/dL or persistent patterns over 250 mg/dL    Improve diet and lifestyle.  Stop drinking juice.  Increase physical activity as tolerated.  Lab Results   Component Value Date    HGBA1C 10.2 (H) 10/07/2024       Orders:    Ambulatory referral to Podiatry    Hemoglobin A1C; Future    Basic metabolic panel; Future    Glucagon (Baqsimi Two Pack) 3 MG/DOSE POWD; Use 1 spray in one nostril in case of severe hypoglycemia and unconscious    Essential hypertension  /62 in the office today.  Continue lisinopril-hydrochlorothiazide 10-12.5 mg daily       Mixed hyperlipidemia  Continue pravastatin 80 mg daily.           History of Present Illness     Ibrahima De Santiago is a 78 y.o. male who presents to the office today for follow-up of type 1 diabetes. His antibodies are normal but C-peptide is low. Diabetes course has been unstable. Complications of diabetes include: Neuropathy, CKD 3, HLD.  Past medical history is significant for HTN, left bundle branch block.  He was last seen in the office 2024 by Dr. Colrey at which time his A1c was 10.4%.  His most recent A1c is now 10.2%.    Denies  recent  episodes of hypoglycemia.  Symptoms of hypoglycemia include: sweaty, mumbles    Current home glucose monitoring: Rosalinda 2     Current Medication Regimen:   Lantus 12 units daily  NovoLog 3 - 3 - 3, plus correctional scale    Diabetic Eye Exam: [facility], scheduled 2/2024  Follows with Podiatry: 10/17/2024, [facility]  Influenza Vaccine: UTD  Has Thyroid Disorder: No  Hypertension, followed by PCP, taking: Lisinopril-hydrochlorothiazide 10-12.5 mg daily  Hyperlipidemia, followed by PCP, taking: Pravastatin 80 mg daily, Tolerating well with no myalgias  History of Pancreatitis: No  Medic Alert Tag: Recommended  Diabetes Education: Attended     CGM REVIEW:  Device used : Rosalinda 2, Home use   Indication: Type 1 Diabetes  Date Range: 10/5/2024 - 10/18/2024  More than 72 hours of data was reviewed. Report to be scanned to chart.     Analysis of data:   Average Glucose: 310 mg/dL  Coefficient of Variation: 18.6%  SD : X mg/dL  Time in Target Range: 3%  Time Above Range: 10% high; 87% very high  Time Below Range: 0% low; 0% very low    Interpretation of data: Blood glucose levels uncontrolled.  Sustained and persistent hyperglycemia.  Minimal return to euglycemia.  No episodes of hypoglycemia.  GMI 10.7%      History obtained from : patient and patient's Significant Other  Review of Systems   Constitutional:  Negative for appetite change, fatigue and unexpected weight change.   HENT:  Negative for congestion, hearing loss and sore throat.    Respiratory:  Negative for cough.    Cardiovascular:  Negative for chest pain, palpitations and leg swelling.   Gastrointestinal:  Negative for abdominal pain, constipation, diarrhea, nausea and vomiting.   Endocrine: Negative for polydipsia and polyuria.   Musculoskeletal:  Negative for myalgias.   Neurological:  Negative for dizziness and tremors.   Psychiatric/Behavioral:  Negative for sleep disturbance.      Current Outpatient Medications on File Prior to Visit    Medication Sig Dispense Refill    amLODIPine (NORVASC) 5 mg tablet Take 5 mg by mouth daily       ammonium lactate (LAC-HYDRIN) 12 % lotion       aspirin 81 MG tablet Take 1 tablet by mouth daily      brimonidine tartrate 0.2 % ophthalmic solution       Cholecalciferol 2000 units CAPS Take 2,000 Units by mouth every other day       citalopram (CeleXA) 40 mg tablet Take 20 mg by mouth daily       Continuous Blood Gluc  (FreeStyle Rosalinda 14 Day [place]) JIMMY Use 1 Device every 14 (fourteen) days 1 each 0    Continuous Glucose Sensor (FreeStyle Rosalinda 2 Sensor) MISC use 1 DEVICE every 14 days 3 each 6    cyanocobalamin (VITAMIN B-12) 500 MCG tablet Take one tablet daily 30 tablet 5    gabapentin, once-daily, (GRALISE) 300 MG tablet Take 600 mg by mouth daily at bedtime       glucose 4 g chewable tablet Chew 4 tablets (16 g total) as needed for low blood sugar 160 tablet 0    insulin aspart (NovoLOG) 100 Units/mL injection pen 3 units before breakfast, and lunch and take 3 units before dinner+ scale      Insulin Glargine Solostar (Lantus SoloStar) 100 UNIT/ML SOPN inject 12 unit subcutaneously every morning      Insulin Pen Needle (Droplet Pen Needles) 31G X 8 MM MISC Use 1 pen needle 4 times a day 100 each 5    lisinopril-hydrochlorothiazide (PRINZIDE,ZESTORETIC) 10-12.5 MG per tablet Take 1 tablet by mouth daily      LORazepam (ATIVAN) 0.5 mg tablet TAKE 1 TABLET (0.5 MG TOTAL)BY MOUTH ONE TIME FOR 1 DOSE, 30 MINUTES BEFORE THE MRI      melatonin 3 mg Take 6 mg by mouth      pravastatin (PRAVACHOL) 80 mg tablet Take 80 mg by mouth daily      traZODone (DESYREL) 100 mg tablet Take 100 mg by mouth daily at bedtime      [DISCONTINUED] Glucagon (Baqsimi Two Pack) 3 MG/DOSE POWD Use 1 spray in one nostril in case of severe hypoglycemia and unconscious 2 each 5    busPIRone (BUSPAR) 5 mg tablet Take 5 mg by mouth 2 (two) times a day (Patient not taking: Reported on 12/1/2022)       No current facility-administered  "medications on file prior to visit.      Social History     Tobacco Use    Smoking status: Former     Current packs/day: 0.00     Types: Cigarettes     Quit date: 1969     Years since quittin.8    Smokeless tobacco: Never   Vaping Use    Vaping status: Never Used   Substance and Sexual Activity    Alcohol use: Not Currently    Drug use: Not Currently    Sexual activity: Not on file         Objective     /62   Pulse 60   Ht 6' 2\" (1.88 m)   Wt 82.3 kg (181 lb 6.4 oz)   SpO2 98%   BMI 23.29 kg/m²     Physical Exam  Vitals reviewed.   Constitutional:       General: He is not in acute distress.     Appearance: Normal appearance. He is well-developed and normal weight.   HENT:      Head: Normocephalic and atraumatic.      Mouth/Throat:      Mouth: Mucous membranes are moist.   Eyes:      Conjunctiva/sclera: Conjunctivae normal.   Cardiovascular:      Rate and Rhythm: Normal rate.   Pulmonary:      Effort: Pulmonary effort is normal. No respiratory distress.   Abdominal:      General: There is no distension.      Palpations: Abdomen is soft.      Tenderness: There is no abdominal tenderness.   Musculoskeletal:         General: No swelling.      Cervical back: Normal range of motion.   Skin:     General: Skin is warm and dry.   Neurological:      Mental Status: He is alert and oriented to person, place, and time.   Psychiatric:         Mood and Affect: Mood normal.         Behavior: Behavior normal.         Thought Content: Thought content normal.     Administrative Statements   I have spent a total time of 37 minutes in caring for this patient on the day of the visit/encounter including Diagnostic results, Prognosis, Risks and benefits of tx options, Instructions for management, Patient and family education, Importance of tx compliance, Risk factor reductions, Impressions, Counseling / Coordination of care, Documenting in the medical record, Reviewing / ordering tests, medicine, procedures  , and Obtaining " or reviewing history  .

## 2024-10-18 NOTE — PATIENT INSTRUCTIONS
Novolog 4 units before meals  Lantus 14 units daily  Rotate your injection sites  Call the office for blood sugars less than 80 mg/dL or persistent patterns over 250 mg/dL         Your Current Insulin  and dose is: Before Breakfast Before Lunch Before Evening Meal Bedtime      Novolog Insulin 4 units        4 units  4 units      Regular, Apidra, Humalog orNovolog Sliding Scale:   <80                      151-200 + 0 +0 +0     201-250 + 0 +0 +0 +0   251-300 + 0 +0 +0 +0   301-350 + 1 +1 +1 +0   >350 + 2 +2 +2 +1         Lantus Insulin    14 units                 Low Blood Sugar  Steps to treat low blood sugar.    1. Test blood sugar if you have symptoms of low blood sugar:   Low Blood Sugar Symptoms:  o Sweaty  o Dizzy  o Rapid heartbeat  o Shaky  o Bad mood  o Hungry    2. Treat blood sugar less than 70 with 15 grams of fast-acting carbohydrate:   Examples of 15 grams Fast-Acting Carbohydrate:  o 4 oz juice/ 4 oz regular soda  o 1 tablespoon honey  o 1 pack of fun size skittles (about 15 individual skittles)  o 12 gummy bears  o 4 starburst   o 3-4 hard candies (chew)  o 3-4 glucose tablets (chew)            3.   Wait 15 minutes and test your blood sugar again         4.   If blood sugar is less than 100, repeat steps 2-3.    5.   When your blood sugar is 100 or more, eat a snack if it will be longer than one hour until your next meal. The snack should be 15 grams of carbohydrate and a protein:   Examples of snacks:  o ½ sandwich  o 6 crackers with cheese or with peanut butter  o Piece of fruit with cheese or peanut butter

## 2024-10-22 ENCOUNTER — TELEPHONE (OUTPATIENT)
Dept: OTHER | Facility: OTHER | Age: 79
End: 2024-10-22

## 2024-10-22 DIAGNOSIS — E10.65 TYPE 1 DIABETES MELLITUS WITH HYPERGLYCEMIA (HCC): Primary | ICD-10-CM

## 2024-10-22 NOTE — TELEPHONE ENCOUNTER
Ronald Pharmacist from [facility] [employer] is calling because  Glucagon (Baqsimi Two Pack) 3 MG/DOSE POWD  and the   Co pay is to high for the patient is there a alternative medication that can be sent in . Please follow and advise. Thank you in advance.

## 2024-10-23 DIAGNOSIS — E10.65 TYPE 1 DIABETES MELLITUS WITH HYPERGLYCEMIA (HCC): Primary | ICD-10-CM

## 2024-10-23 RX ORDER — IBUPROFEN 600 MG/1
TABLET ORAL
Qty: 1 KIT | Refills: 1 | Status: SHIPPED | OUTPATIENT
Start: 2024-10-23

## 2024-10-23 NOTE — TELEPHONE ENCOUNTER
"Pharmacist from [facility] calling in states that the Glucagon 1 mg/0.2 ml solution will be $400 said if provider resends as \"glucagon 1 mg emergency kit\"  it will be cheaper for the patient.  He asks that it be resent.   "

## 2024-11-18 ENCOUNTER — OFFICE VISIT (OUTPATIENT)
Dept: ENDOCRINOLOGY | Facility: CLINIC | Age: 79
End: 2024-11-18
Payer: MEDICARE

## 2024-11-18 ENCOUNTER — NURSE TRIAGE (OUTPATIENT)
Age: 79
End: 2024-11-18

## 2024-11-18 VITALS
HEART RATE: 62 BPM | WEIGHT: 182 LBS | DIASTOLIC BLOOD PRESSURE: 70 MMHG | SYSTOLIC BLOOD PRESSURE: 132 MMHG | BODY MASS INDEX: 23.36 KG/M2 | OXYGEN SATURATION: 96 % | HEIGHT: 74 IN

## 2024-11-18 DIAGNOSIS — E78.2 MIXED HYPERLIPIDEMIA: ICD-10-CM

## 2024-11-18 DIAGNOSIS — E10.311 TYPE 1 DIABETES MELLITUS WITH RETINOPATHY AND MACULAR EDEMA, UNSPECIFIED LATERALITY, UNSPECIFIED RETINOPATHY SEVERITY (HCC): ICD-10-CM

## 2024-11-18 DIAGNOSIS — I10 ESSENTIAL HYPERTENSION: ICD-10-CM

## 2024-11-18 DIAGNOSIS — E10.65 TYPE 1 DIABETES MELLITUS WITH HYPERGLYCEMIA (HCC): Primary | ICD-10-CM

## 2024-11-18 PROCEDURE — 95251 CONT GLUC MNTR ANALYSIS I&R: CPT | Performed by: PHYSICIAN ASSISTANT

## 2024-11-18 PROCEDURE — 99214 OFFICE O/P EST MOD 30 MIN: CPT | Performed by: PHYSICIAN ASSISTANT

## 2024-11-18 RX ORDER — INSULIN GLARGINE 100 [IU]/ML
INJECTION, SOLUTION SUBCUTANEOUS
Status: SHIPPED
Start: 2024-11-18

## 2024-11-18 NOTE — TELEPHONE ENCOUNTER
----- Message from Carmencita LOZOYA sent at 11/18/2024 10:41 AM EST -----  Pts sugars are up and down. Can u please call them back I was able to get them an appt today at 3pm.

## 2024-11-18 NOTE — TELEPHONE ENCOUNTER
"Sophia called to report elevated sugars with Ibrahima. She said he denies symptoms at this time, but his sugars have been consistently elevated. They have a paper they will be bringing in to today's appt with his sugars. He has an appt today at 3pm.         Reason for Disposition   Patient wants to be seen    Answer Assessment - Initial Assessment Questions  1. BLOOD GLUCOSE: \"What is your blood glucose level?\"       [phone]   2. ONSET: \"When did you check the blood glucose?\"      Current 395  3. USUAL RANGE: \"What is your glucose level usually?\" (e.g., usual fasting morning value, usual evening value)    Varies   4. KETONES: \"Do you check for ketones (urine or blood test strips)?\" If Yes, ask: \"What does the test show now?\"       unsure  5. TYPE 1 or 2:  \"Do you know what type of diabetes you have?\"  (e.g., Type 1, Type 2, Gestational; doesn't know)       Type 2  6. INSULIN: \"Do you take insulin?\" \"What type of insulin(s) do you use? What is the mode of delivery? (syringe, pen; injection or pump)?\"       Yes   7. DIABETES PILLS: \"Do you take any pills for your diabetes?\" If Yes, ask: \"Have you missed taking any pills recently?\"      denies  8. OTHER SYMPTOMS: \"Do you have any symptoms?\" (e.g., fever, frequent urination, difficulty breathing, dizziness, weakness, vomiting)      denies    Protocols used: Diabetes - High Blood Sugar-Adult-OH    "

## 2024-11-18 NOTE — PATIENT INSTRUCTIONS
Your Current Insulin  and dose is: Before Breakfast Before Lunch Before Evening Meal Bedtime      Novolog Insulin   5 units       5 units    5 units      Regular, Apidra, Humalog orNovolog Sliding Scale:   <80                      151-200 + 0 +0 +0     201-250 + 0 +0 +0 +0   251-300 + 1 +1 +1 +0   301-350 + 2 +2 +2 +0   >350 + 3 +3 +3 +1         Lantus Insulin 14 units               Hypoglycemia instructions   Ibrahima SIENNA Anyi  11/18/2024  [id number]    Low Blood Sugar    Steps to treat low blood sugar.    1. Test blood sugar if you have symptoms of low blood sugar:   Low Blood Sugar Symptoms:  o Sweaty  o Dizzy  o Rapid heartbeat  o Shaky  o Bad mood  o Hungry      2. Treat blood sugar less than 70 with 15 grams of fast-acting carbohydrate:   Examples of 15 grams Fast-Acting Carbohydrate:  o 4 oz juice  o 4 oz regular soda  o 3-4 glucose tablets (chew)  o 3-4 hard candies (chew)          3.  Wait 15 minutes and test your blood sugar again     4. If blood sugar is less than 100, repeat steps 2-3.    5. When your blood sugar is 100 or more, eat a snack if it will be longer than one hour until your next meal. The snack should be 15 grams of carbohydrate and a protein:   Examples of snacks:  o ½ sandwich  o 6 crackers with cheese  o Piece of fruit with cheese or peanut butter  o 6 crackers with peanut butter

## 2024-11-20 ENCOUNTER — TELEPHONE (OUTPATIENT)
Dept: ENDOCRINOLOGY | Facility: CLINIC | Age: 79
End: 2024-11-20

## 2024-11-25 ENCOUNTER — FOLLOW UP (OUTPATIENT)
Dept: URBAN - METROPOLITAN AREA CLINIC 6 | Facility: CLINIC | Age: 79
End: 2024-11-25

## 2024-11-25 DIAGNOSIS — H52.203: ICD-10-CM

## 2024-11-25 DIAGNOSIS — H52.13: ICD-10-CM

## 2024-11-25 DIAGNOSIS — H52.4: ICD-10-CM

## 2024-11-25 PROCEDURE — 92015 DETERMINE REFRACTIVE STATE: CPT

## 2024-11-25 ASSESSMENT — VISUAL ACUITY
OS_CC: 20/40-1
OD_CC: 20/40-2
OU_CC: J1

## 2024-11-25 ASSESSMENT — TONOMETRY
OS_IOP_MMHG: 17
OD_IOP_MMHG: 16

## 2024-12-02 LAB
DME PARACHUTE DELIVERY DATE ACTUAL: NORMAL
DME PARACHUTE DELIVERY DATE EXPECTED: NORMAL
DME PARACHUTE DELIVERY DATE REQUESTED: NORMAL
DME PARACHUTE ITEM DESCRIPTION: NORMAL
DME PARACHUTE ITEM DESCRIPTION: NORMAL
DME PARACHUTE ORDER STATUS: NORMAL
DME PARACHUTE SUPPLIER NAME: NORMAL
DME PARACHUTE SUPPLIER PHONE: NORMAL

## 2024-12-11 DIAGNOSIS — E10.65 TYPE 1 DIABETES MELLITUS WITH HYPERGLYCEMIA (HCC): ICD-10-CM

## 2024-12-12 RX ORDER — INSULIN GLARGINE 100 [IU]/ML
INJECTION, SOLUTION SUBCUTANEOUS
Qty: 15 ML | Refills: 5 | Status: SHIPPED | OUTPATIENT
Start: 2024-12-12

## 2025-01-29 DIAGNOSIS — E10.65 TYPE 1 DIABETES MELLITUS WITH HYPERGLYCEMIA (HCC): ICD-10-CM

## 2025-01-30 RX ORDER — INSULIN ASPART 100 [IU]/ML
INJECTION, SOLUTION INTRAVENOUS; SUBCUTANEOUS
Qty: 15 ML | Refills: 1 | Status: SHIPPED | OUTPATIENT
Start: 2025-01-30

## 2025-03-10 ENCOUNTER — TELEPHONE (OUTPATIENT)
Age: 80
End: 2025-03-10

## 2025-03-10 NOTE — TELEPHONE ENCOUNTER
Patient's wife would like to know if Ibrahima is suppose to bring his recorded sugars with him to his upcoming appointment in April, or if she wants them sooner.      Please advise.

## 2025-03-10 NOTE — TELEPHONE ENCOUNTER
He should bring his log / sensor to download to his next appointment.  If blood sugars have been trending high or too low persistently then we can schedule a nurse visit download sooner or send a log.

## 2025-03-25 ENCOUNTER — TELEPHONE (OUTPATIENT)
Age: 80
End: 2025-03-25

## 2025-03-25 NOTE — TELEPHONE ENCOUNTER
Patients wife calling in stating patient had a missed call from office. Unable to locate any notes with attempts to reach patient/request for cb. Please call patient back if attempting to reach him, thank you.

## 2025-03-27 NOTE — TELEPHONE ENCOUNTER
No notes indicated that we have called the pt recently except for the call on 3/10 we spoke with pt wife

## 2025-03-31 ENCOUNTER — TRANSCRIBE ORDERS (OUTPATIENT)
Dept: LAB | Facility: AMBULARY SURGERY CENTER | Age: 80
End: 2025-03-31

## 2025-03-31 ENCOUNTER — APPOINTMENT (OUTPATIENT)
Dept: LAB | Facility: AMBULARY SURGERY CENTER | Age: 80
End: 2025-03-31
Payer: MEDICARE

## 2025-03-31 ENCOUNTER — TRANSCRIBE ORDERS (OUTPATIENT)
Dept: LAB | Facility: HOSPITAL | Age: 80
End: 2025-03-31

## 2025-03-31 ENCOUNTER — RESULTS FOLLOW-UP (OUTPATIENT)
Age: 80
End: 2025-03-31

## 2025-03-31 DIAGNOSIS — E10.65 TYPE 1 DIABETES MELLITUS WITH HYPERGLYCEMIA (HCC): ICD-10-CM

## 2025-03-31 DIAGNOSIS — Z85.46 HISTORY OF PROSTATE CANCER: Primary | ICD-10-CM

## 2025-03-31 DIAGNOSIS — Z85.46 HISTORY OF PROSTATE CANCER: ICD-10-CM

## 2025-03-31 DIAGNOSIS — Z85.46 PERSONAL HISTORY OF MALIGNANT NEOPLASM OF PROSTATE: Primary | ICD-10-CM

## 2025-03-31 DIAGNOSIS — Z85.46 PERSONAL HISTORY OF MALIGNANT NEOPLASM OF PROSTATE: ICD-10-CM

## 2025-03-31 LAB
ANION GAP SERPL CALCULATED.3IONS-SCNC: 9 MMOL/L (ref 4–13)
BUN SERPL-MCNC: 22 MG/DL (ref 5–25)
CALCIUM SERPL-MCNC: 9.6 MG/DL (ref 8.4–10.2)
CHLORIDE SERPL-SCNC: 103 MMOL/L (ref 96–108)
CHOLEST SERPL-MCNC: 139 MG/DL (ref ?–200)
CO2 SERPL-SCNC: 28 MMOL/L (ref 21–32)
CREAT SERPL-MCNC: 1.3 MG/DL (ref 0.6–1.3)
CREAT UR-MCNC: 172.8 MG/DL
ERYTHROCYTE [DISTWIDTH] IN BLOOD BY AUTOMATED COUNT: 12.9 % (ref 11.6–15.1)
EST. AVERAGE GLUCOSE BLD GHB EST-MCNC: 217 MG/DL
GFR SERPL CREATININE-BSD FRML MDRD: 51 ML/MIN/1.73SQ M
GLUCOSE P FAST SERPL-MCNC: 108 MG/DL (ref 65–99)
HBA1C MFR BLD: 9.2 %
HCT VFR BLD AUTO: 34.5 % (ref 36.5–49.3)
HDLC SERPL-MCNC: 48 MG/DL
HGB BLD-MCNC: 11.3 G/DL (ref 12–17)
LDLC SERPL CALC-MCNC: 78 MG/DL (ref 0–100)
MCH RBC QN AUTO: 30.6 PG (ref 26.8–34.3)
MCHC RBC AUTO-ENTMCNC: 32.8 G/DL (ref 31.4–37.4)
MCV RBC AUTO: 94 FL (ref 82–98)
MICROALBUMIN UR-MCNC: 27.2 MG/L
MICROALBUMIN/CREAT 24H UR: 16 MG/G CREATININE (ref 0–30)
NONHDLC SERPL-MCNC: 91 MG/DL
PLATELET # BLD AUTO: 148 THOUSANDS/UL (ref 149–390)
PMV BLD AUTO: 12.3 FL (ref 8.9–12.7)
POTASSIUM SERPL-SCNC: 3.9 MMOL/L (ref 3.5–5.3)
PSA SERPL-MCNC: 0.02 NG/ML (ref 0–4)
RBC # BLD AUTO: 3.69 MILLION/UL (ref 3.88–5.62)
SODIUM SERPL-SCNC: 140 MMOL/L (ref 135–147)
TRIGL SERPL-MCNC: 64 MG/DL (ref ?–150)
WBC # BLD AUTO: 3.76 THOUSAND/UL (ref 4.31–10.16)

## 2025-03-31 PROCEDURE — 83036 HEMOGLOBIN GLYCOSYLATED A1C: CPT

## 2025-03-31 PROCEDURE — 82570 ASSAY OF URINE CREATININE: CPT

## 2025-03-31 PROCEDURE — 80061 LIPID PANEL: CPT

## 2025-03-31 PROCEDURE — 85027 COMPLETE CBC AUTOMATED: CPT

## 2025-03-31 PROCEDURE — 80048 BASIC METABOLIC PNL TOTAL CA: CPT

## 2025-03-31 PROCEDURE — 82043 UR ALBUMIN QUANTITATIVE: CPT

## 2025-03-31 PROCEDURE — 36415 COLL VENOUS BLD VENIPUNCTURE: CPT

## 2025-04-07 DIAGNOSIS — E10.65 TYPE 1 DIABETES MELLITUS WITH HYPERGLYCEMIA (HCC): ICD-10-CM

## 2025-04-07 RX ORDER — PEN NEEDLE, DIABETIC 31 GX5/16"
NEEDLE, DISPOSABLE MISCELLANEOUS
Qty: 100 EACH | Refills: 5 | Status: SHIPPED | OUTPATIENT
Start: 2025-04-07

## 2025-04-08 ENCOUNTER — OFFICE VISIT (OUTPATIENT)
Dept: ENDOCRINOLOGY | Facility: CLINIC | Age: 80
End: 2025-04-08
Payer: MEDICARE

## 2025-04-08 VITALS
DIASTOLIC BLOOD PRESSURE: 70 MMHG | BODY MASS INDEX: 23.1 KG/M2 | HEIGHT: 74 IN | WEIGHT: 180 LBS | SYSTOLIC BLOOD PRESSURE: 130 MMHG | OXYGEN SATURATION: 98 % | HEART RATE: 72 BPM

## 2025-04-08 DIAGNOSIS — E10.65 TYPE 1 DIABETES MELLITUS WITH HYPERGLYCEMIA (HCC): Primary | ICD-10-CM

## 2025-04-08 DIAGNOSIS — N18.31 STAGE 3A CHRONIC KIDNEY DISEASE (HCC): ICD-10-CM

## 2025-04-08 DIAGNOSIS — E10.311 TYPE 1 DIABETES MELLITUS WITH RETINOPATHY AND MACULAR EDEMA, UNSPECIFIED LATERALITY, UNSPECIFIED RETINOPATHY SEVERITY (HCC): ICD-10-CM

## 2025-04-08 DIAGNOSIS — I10 ESSENTIAL HYPERTENSION: ICD-10-CM

## 2025-04-08 DIAGNOSIS — E78.2 MIXED HYPERLIPIDEMIA: ICD-10-CM

## 2025-04-08 PROCEDURE — 95251 CONT GLUC MNTR ANALYSIS I&R: CPT | Performed by: INTERNAL MEDICINE

## 2025-04-08 PROCEDURE — 99214 OFFICE O/P EST MOD 30 MIN: CPT | Performed by: INTERNAL MEDICINE

## 2025-04-08 RX ORDER — TAMSULOSIN HYDROCHLORIDE 0.4 MG/1
0.4 CAPSULE ORAL
COMMUNITY
Start: 2025-03-14

## 2025-04-08 RX ORDER — INSULIN GLARGINE 100 [IU]/ML
INJECTION, SOLUTION SUBCUTANEOUS
Start: 2025-04-08

## 2025-04-08 RX ORDER — BUPROPION HYDROCHLORIDE 75 MG/1
75 TABLET ORAL
COMMUNITY
Start: 2025-03-12

## 2025-04-08 RX ORDER — INSULIN ASPART 100 [IU]/ML
INJECTION, SOLUTION INTRAVENOUS; SUBCUTANEOUS
Start: 2025-04-08

## 2025-04-08 RX ORDER — MEMANTINE HYDROCHLORIDE 5 MG/1
5 TABLET ORAL 2 TIMES DAILY
COMMUNITY
Start: 2025-02-06

## 2025-04-08 NOTE — PATIENT INSTRUCTIONS
INSULIN DOSAGE INSTRUCTIONS    Name: Ibrahima De Santiago                        : 1945  MRN #: [id number]    Your Current Insulin  and dose is: Before Breakfast Before Lunch Before Evening Meal Bedtime     Novolog Insulin 3 units      6 units  8 units     Regular, Apidra, Humalog orNovolog Sliding Scale:   <80              151-200 + 0 +0 +0    201-250 + 0 +0 +0 +   251-300 + 0 +0 +0 +   301-350 + 1 +1 +1 +   >350 + 2 +2 +2 +1       Lantus Insulin   12  units         Additional Instructions:   Please test your blood sugar:  _4_ Times per day.  X_ Before Breakfast                _ Alternate Testing  X_ Before Lunch                _ 2 Hours After  Meal  X_ Before Evening Meal               _ 3 a.m.  x_ Before Bedtime Snack     Target Blood sugar range _70_to _140__.  Call if your Michael Pierre MD  blood sugar is less than _60_ or greater than _400__.    Today's Date: 2025       Hypoglycemia instructions   Ibrahima De Santiago  2025  [id number]    Low Blood Sugar    Steps to treat low blood sugar.    1. Test blood sugar if you have symptoms of low blood sugar:   Low Blood Sugar Symptoms:  o Sweaty  o Dizzy  o Rapid heartbeat  o Shaky    o Bad mood  o Hungry      2. Treat blood sugar less than 70 with 15 grams of fast-acting carbohydrate:   Examples of 15 grams Fast-Acting Carbohydrate:  o 4 oz juice  o 4 oz regular soda  o 3-4 glucose tablets (chew)  o 3-4 hard candies (chew)              3.   Wait 15 minutes and test your blood sugar again           4. If blood sugar is less than 100, repeat steps 2-3.      5. When your blood sugar is 100 or more, eat a snack if it will be longer than one hour until your next meal. The snack should be 15 grams of carbohydrate and a protein:   Examples of snacks:  o ½ sandwich  o 6 crackers with cheese  o Piece of fruit with cheese or peanut butter  o 6 crackers with peanut butter

## 2025-04-08 NOTE — ASSESSMENT & PLAN NOTE
BP Readings from Last 3 Encounters:   04/08/25 130/70   11/18/24 132/70   10/18/24 120/62   Blood pressure is usually well-controlled, continue current management

## 2025-04-08 NOTE — ASSESSMENT & PLAN NOTE
Lab Results   Component Value Date    HGBA1C 9.2 (H) 03/31/2025   A1c is 9.2%, uncontrolled .  Adjustment made to insulin regimen

## 2025-04-08 NOTE — ASSESSMENT & PLAN NOTE
Lab Results   Component Value Date    EGFR 51 03/31/2025    EGFR 55 10/07/2024    EGFR 55 06/25/2024    CREATININE 1.30 03/31/2025    CREATININE 1.23 10/07/2024    CREATININE 1.24 06/25/2024   GFR is 51, stable.  Educated about keeping himself well-hydrated

## 2025-04-08 NOTE — ASSESSMENT & PLAN NOTE
Lab Results   Component Value Date    HGBA1C 9.2 (H) 03/31/2025   Type 1 diabetes with hyperglycemia, A1c uncontrolled.  As per continuous glucose monitor sensor download GMI is 8.6%, average glucose 222 mg per DL  Overall blood sugars are elevated especially after lunch and dinner  Blood sugars are usually dropping after breakfast  Will reduce NovoLog to 3 units before breakfast from 5 units, increase insulin NovoLog to 6 units with lunch and 8 units with dinner plus scale  Discussed to give carbohydrate consistent with meals  Educated about hypoglycemia symptoms and treatment  Discussed the consequences of severe hypoglycemia and hyperglycemia and importance of use of continuous glucose monitor sensor regularly    Orders:    Ambulatory referral to Podiatry; Future    insulin aspart (NovoLOG FlexPen) 100 UNIT/ML injection pen; Inject 3 units with breakfast, 6 units with lunch and 8 units with dinner +scale    Insulin Glargine Solostar (Lantus SoloStar) 100 UNIT/ML SOPN; inject 14 unit subcutaneously every morning    Ambulatory Referral to Ophthalmology; Future    Albumin / creatinine urine ratio; Future    Hemoglobin A1C; Future    Basic metabolic panel; Future

## 2025-04-08 NOTE — ASSESSMENT & PLAN NOTE
Lab Results   Component Value Date    LDLCALC 78 03/31/2025   Continue Pravastatin 80 mg po daily

## 2025-04-08 NOTE — PROGRESS NOTES
Name: Ibrahima De Santiago      : 1945      MRN: [id number]  Encounter Provider: Satish Corley MD  Encounter Date: 2025   Encounter department: [facility] [place]    :  Assessment & Plan  Type 1 diabetes mellitus with hyperglycemia (HCC)    Lab Results   Component Value Date    HGBA1C 9.2 (H) 2025   Type 1 diabetes with hyperglycemia, A1c uncontrolled.  As per continuous glucose monitor sensor download GMI is 8.6%, average glucose 222 mg per DL  Overall blood sugars are elevated especially after lunch and dinner  Blood sugars are usually dropping after breakfast  Will reduce NovoLog to 3 units before breakfast from 5 units, increase insulin NovoLog to 6 units with lunch and 8 units with dinner plus scale  Discussed to give carbohydrate consistent with meals  Educated about hypoglycemia symptoms and treatment  Discussed the consequences of severe hypoglycemia and hyperglycemia and importance of use of continuous glucose monitor sensor regularly    Orders:    Ambulatory referral to Podiatry; Future    insulin aspart (NovoLOG FlexPen) 100 UNIT/ML injection pen; Inject 3 units with breakfast, 6 units with lunch and 8 units with dinner +scale    Insulin Glargine Solostar (Lantus SoloStar) 100 UNIT/ML SOPN; inject 14 unit subcutaneously every morning    Ambulatory Referral to Ophthalmology; Future    Albumin / creatinine urine ratio; Future    Hemoglobin A1C; Future    Basic metabolic panel; Future    Mixed hyperlipidemia    Lab Results   Component Value Date    LDLCALC 78 2025   Continue Pravastatin 80 mg po daily          Essential hypertension  BP Readings from Last 3 Encounters:   25 130/70   24 132/70   10/18/24 120/62   Blood pressure is usually well-controlled, continue current management       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  Adjustment made to insulin regimen         Stage 3a chronic kidney disease (HCC)  Lab Results   Component Value Date    EGFR 51 03/31/2025    EGFR 55 10/07/2024    EGFR 55 06/25/2024    CREATININE 1.30 03/31/2025    CREATININE 1.23 10/07/2024    CREATININE 1.24 06/25/2024   GFR is 51, stable.  Educated about keeping himself well-hydrated             History of Present Illness   History of Present Illness    Ibrahima De Santiago is a 79 y.o. male with type 1 diabetes seen in follow up. Reports complications of diabetes such as CKD stage 3  . Denies recent severe hypoglycemic or severe hyperglycemic episodes. Denies any issues with his current regimen. Last A1C was   Lab Results   Component Value Date    HGBA1C 9.2 (H) 03/31/2025     . Denies recent illness, hospitalization or steroid use.     Lab Results   Component Value Date    CREATININE 1.30 03/31/2025       Home blood glucometer readings:   Patient uses continuous glucose monitor sensor by freestyle chas 3.  2 weeks of review from April 2, 2025 to April 15, 2025 reviewed more than 72 hours of data reviewed.  Continuous glucose monitor sensor download was scanned in the chart.  Average glucose is 222 mg per DL, GMI is 8.6%, glucose variability is 29.7%.  44% blood sugars are high, 25% blood sugars in target range, 31% blood sugars are very high, 0% blood sugars are low or very low.  Interpretation of data-patient has low blood sugars specially after breakfast, blood sugars elevated after lunch and after dinner    Current regimen:   Insulin glargine 12 units in the morning, insulin aspart 3 units with breakfast, lunch and dinner plus scale      Last Eye Exam: Not on file  Last Foot Exam: 05/10/2023  Health Maintenance   Topic Date Due    Diabetic Eye Exam  12/03/2021    Diabetic Foot Exam  05/10/2024       Review of Systems   Constitutional:  Positive for activity change. Negative for  diaphoresis, fatigue, fever and unexpected weight change.   HENT: Negative.     Eyes:  Negative for visual disturbance.   Respiratory:  Negative for cough, chest tightness and shortness of breath.    Cardiovascular:  Negative for chest pain, palpitations and leg swelling.   Gastrointestinal:  Negative for abdominal pain, blood in stool, constipation, diarrhea, nausea and vomiting.   Endocrine: Negative for cold intolerance, heat intolerance, polydipsia, polyphagia and polyuria.   Genitourinary:  Negative for dysuria, enuresis, frequency and urgency.   Musculoskeletal:  Positive for arthralgias, gait problem and myalgias.   Skin:  Negative for pallor, rash and wound.   Allergic/Immunologic: Negative.    Neurological:  Negative for dizziness, tremors, weakness and numbness.   Hematological: Negative.    Psychiatric/Behavioral: Negative.      as per HPI    Current Outpatient Medications on File Prior to Visit   Medication Sig Dispense Refill    amLODIPine (NORVASC) 5 mg tablet Take 5 mg by mouth daily       ammonium lactate (LAC-HYDRIN) 12 % lotion       aspirin 81 MG tablet Take 1 tablet by mouth daily      brimonidine tartrate 0.2 % ophthalmic solution       buPROPion (WELLBUTRIN) 75 mg tablet Take 75 mg by mouth      Cholecalciferol 2000 units CAPS Take 2,000 Units by mouth every other day       citalopram (CeleXA) 40 mg tablet Take 20 mg by mouth daily       Continuous Blood Gluc  (FreeStyle Rosalinda 14 Day Bighorn) JIMMY Use 1 Device every 14 (fourteen) days 1 each 0    Continuous Glucose Sensor (FreeStyle Rosalinda 2 Sensor) MISC use 1 DEVICE every 14 days 3 each 6    cyanocobalamin (VITAMIN B-12) 500 MCG tablet Take one tablet daily 30 tablet 5    gabapentin, once-daily, (GRALISE) 300 MG tablet Take 600 mg by mouth daily at bedtime       Glucagon, rDNA, (Glucagon Emergency) 1 MG KIT Use to correct severe hypoglycemia when unconscious or seizing. 1 kit 1    glucose 4 g chewable tablet Chew 4 tablets (16 g total)  "as needed for low blood sugar 160 tablet 0    Insulin Pen Needle (Droplet Pen Needles) 31G X 8 MM MISC Use 1 pen needle 4 times a day 100 each 5    lisinopril-hydrochlorothiazide (PRINZIDE,ZESTORETIC) 10-12.5 MG per tablet Take 1 tablet by mouth daily      LORazepam (ATIVAN) 0.5 mg tablet TAKE 1 TABLET (0.5 MG TOTAL)BY MOUTH ONE TIME FOR 1 DOSE, 30 MINUTES BEFORE THE MRI      melatonin 3 mg Take 6 mg by mouth      memantine (NAMENDA) 5 mg tablet Take 5 mg by mouth 2 (two) times a day      pravastatin (PRAVACHOL) 80 mg tablet Take 80 mg by mouth daily      tamsulosin (FLOMAX) 0.4 mg Take 0.4 mg by mouth      traZODone (DESYREL) 100 mg tablet Take 100 mg by mouth daily at bedtime      [DISCONTINUED] insulin aspart (NovoLOG FlexPen) 100 UNIT/ML injection pen inject 3 units subcutaneously before breakfast and LUNCH and inject 5 units subcutaneously before dinner (+ SCALE) (Patient taking differently: inject 5 units subcutaneously before breakfast and LUNCH and inject 5 units subcutaneously before dinner (+ SCALE)) 15 mL 1    [DISCONTINUED] Insulin Glargine Solostar (Lantus SoloStar) 100 UNIT/ML SOPN inject 12 unit subcutaneously every morning 15 mL 5    busPIRone (BUSPAR) 5 mg tablet Take 5 mg by mouth 2 (two) times a day (Patient not taking: Reported on 12/1/2022)       No current facility-administered medications on file prior to visit.         Medical History Reviewed by provider this encounter:     .    Objective   /70 (BP Location: Left arm, Patient Position: Sitting, Cuff Size: Large)   Pulse 72   Ht 6' 2\" (1.88 m)   Wt 81.6 kg (180 lb)   SpO2 98%   BMI 23.11 kg/m²      Body mass index is 23.11 kg/m².  Wt Readings from Last 3 Encounters:   04/08/25 81.6 kg (180 lb)   11/18/24 82.6 kg (182 lb)   10/18/24 82.3 kg (181 lb 6.4 oz)     Physical Exam    Physical Exam  Constitutional:       Appearance: Normal appearance.   Musculoskeletal:         General: Normal range of motion.      Cervical back: Normal " "range of motion and neck supple.      Right lower leg: No edema.      Left lower leg: No edema.   Skin:     General: Skin is warm.      Findings: No rash.   Neurological:      General: No focal deficit present.      Mental Status: He is alert and oriented to person, place, and time.   Psychiatric:         Mood and Affect: Mood normal.         Behavior: Behavior normal.       Results    Labs:   Lab Results   Component Value Date    HGBA1C 9.2 (H) 03/31/2025    HGBA1C 10.2 (H) 10/07/2024    HGBA1C 10.4 (H) 06/25/2024     Lab Results   Component Value Date    CREATININE 1.30 03/31/2025    CREATININE 1.23 10/07/2024    CREATININE 1.24 06/25/2024    BUN 22 03/31/2025     05/01/2015    K 3.9 03/31/2025     03/31/2025    CO2 28 03/31/2025     GFR, Calculated   Date Value Ref Range Status   01/09/2019 49 (L) >60 mL/min/1.73m2 Final     Comment:     mL/min per 1.73 square meters                                            Normal Function or Mild Renal    Disease (if clinically at risk):  >or=60  Moderately Decreased:                30-59  Severely Decreased:                  15-29  Renal Failure:                         <15                                            -American GFR: multiply reported GFR by 1.16    Please note that the eGFR is based on the CKD-EPI calculation, and is not intended to be used for drug dosing.                                            Note: Calculated GFR may not be an accurate indicator of renal function if the patient's renal function is not in a steady state.     eGFRcr   Date Value Ref Range Status   02/15/2024 85 >59 Final     eGFR   Date Value Ref Range Status   03/31/2025 51 ml/min/1.73sq m Final     Lab Results   Component Value Date    HDL 48 03/31/2025    TRIG 64 03/31/2025     Lab Results   Component Value Date    ALT 15 04/30/2024    AST 18 04/30/2024    ALKPHOS 109 (H) 04/30/2024     No results found for: \"[id number]\"    There are no Patient Instructions on file " for this visit.    Discussed with the patient and all questioned fully answered. He will call me if any problems arise.

## 2025-04-20 NOTE — TELEPHONE ENCOUNTER
He was ordered for sedation   Will need reschedule with sedation
Mrs Korina Burgos will try to reschedule it
Received a call that [facility] was unable to have the cat scan  He said he was claustrophobic and became upset when they tried      Please advise
---

## 2025-05-05 ENCOUNTER — NURSE TRIAGE (OUTPATIENT)
Age: 80
End: 2025-05-05

## 2025-05-05 DIAGNOSIS — E10.65 TYPE 1 DIABETES MELLITUS WITH HYPERGLYCEMIA (HCC): ICD-10-CM

## 2025-05-05 RX ORDER — INSULIN ASPART 100 [IU]/ML
INJECTION, SOLUTION INTRAVENOUS; SUBCUTANEOUS
Start: 2025-05-05

## 2025-05-05 RX ORDER — INSULIN GLARGINE 100 [IU]/ML
INJECTION, SOLUTION SUBCUTANEOUS
Start: 2025-05-05

## 2025-05-05 NOTE — TELEPHONE ENCOUNTER
"Verbal consent obtained from pt to speak to brother in law Salazar.       FOLLOW UP: call back from office     REASON FOR CONVERSATION: Blood Sugars     SYMPTOMS: elevated blood sugar, increased thirst    OTHER: reported that noted more recently, elevated bs , denies s/s of infection , no steroid use . Uses freestyle chas 3, will need to drop off data for review. Could not find manual finger stick   monitor. Currently BS is 364.      Reported readings below  05/5 before breakfast 216 after 260          Before lunch 297 after 398  05/4 before breakfast 206 after 287          Before lunch 346 after 330          Before dinner 300 after 270          At bedtime 241  05/3 before breakfast 364 after 302          Before lunch 252 after 210          Before dinner 290 after 250          At bedtime 288  05/2 before breakfast 245 after 300          Before lunch 388 after 394          Before dinner 348 after 356          At bedtime 384      Denies changes in diet. Confirmed taking novolog as directed and lantus 12 units in the morning (please advise 12 units mentioned in the office note but  rx send on 04/8  says 14 units . S/s that pt would have to report to ED was reviewed with Martin.     DISPOSITION: callback from office           Reason for Disposition   Blood glucose > 300 mg/dL (16.7 mmol/L)    Answer Assessment - Initial Assessment Questions  1. BLOOD GLUCOSE: \"What is your blood glucose level?\"       Currently 364  2. ONSET: \"When did you check the blood glucose?\"      Past couple of days   3. USUAL RANGE: \"What is your glucose level usually?\" (e.g., usual fasting morning value, usual evening value)        4. KETONES: \"Do you check for ketones (urine or blood test strips)?\" If Yes, ask: \"What does the test show now?\"         5. TYPE 1 or 2:  \"Do you know what type of diabetes you have?\"  (e.g., Type 1, Type 2, Gestational; doesn't know)       Type 1  6. INSULIN: \"Do you take insulin?\" \"What type of insulin(s) do you use? " "What is the mode of delivery? (syringe, pen; injection or pump)?\"       Lantus 12 units in the morning . Novolog as directed.   7. DIABETES PILLS: \"Do you take any pills for your diabetes?\" If Yes, ask: \"Have you missed taking any pills recently?\"      Denies   8. OTHER SYMPTOMS: \"Do you have any symptoms?\" (e.g., fever, frequent urination, difficulty breathing, dizziness, weakness, vomiting)      Increased thirst    Protocols used: Diabetes - High Blood Sugar-Adult-OH    "

## 2025-05-05 NOTE — TELEPHONE ENCOUNTER
Blood sugars reviewed, blood sugars are usually ranging in 250 to 350 mg per DL range  Please inform patient to increase Lantus to 18 units  Increase NovoLog to 5 units with breakfast, 8 units with lunch and 10 units with dinner plus scale  If he develops nausea vomiting, excessively tiredness and fatigue he should go to the ER

## 2025-05-05 NOTE — TELEPHONE ENCOUNTER
Phone call from patient's brother in law (patient in the same room). However, brother in law not listed on Patient Communication Form.    Patient's brother in law reports blood sugar of 382 - attempted to transfer to [facility] (not avail). Please contact patient. Patient's brother in law available 1/2 hr to assist with patient. Otherwise, you will need to speak to patient's spouse [phone].

## 2025-06-05 ENCOUNTER — TELEPHONE (OUTPATIENT)
Dept: ENDOCRINOLOGY | Facility: CLINIC | Age: 80
End: 2025-06-05

## 2025-06-10 ENCOUNTER — TELEPHONE (OUTPATIENT)
Dept: ENDOCRINOLOGY | Facility: CLINIC | Age: 80
End: 2025-06-10

## 2025-06-16 ENCOUNTER — OFFICE VISIT (OUTPATIENT)
Dept: PODIATRY | Facility: CLINIC | Age: 80
End: 2025-06-16
Attending: INTERNAL MEDICINE
Payer: MEDICARE

## 2025-06-16 DIAGNOSIS — E10.51: Primary | ICD-10-CM

## 2025-06-16 DIAGNOSIS — B35.1 ONYCHOMYCOSIS: ICD-10-CM

## 2025-06-16 PROCEDURE — 11721 DEBRIDE NAIL 6 OR MORE: CPT | Performed by: PODIATRIST

## 2025-06-16 PROCEDURE — 99203 OFFICE O/P NEW LOW 30 MIN: CPT | Performed by: PODIATRIST

## 2025-06-16 NOTE — PROGRESS NOTES
Name: Ibrahima De Santiago      : 1945      MRN: [id number]  Encounter Provider: Gamaliel Ochoa DPM  Encounter Date: 2025   Encounter department: [facility] [place]    Discussed principles of diabetic footcare.  There are no palpable pedal pulses.  Sensorium is intact right foot but diminished left foot.  Urged patient to refrain from walking barefoot.    All toenails are thick and yellow with subungual debris.  Treatment consisted of debridement of 10 mycotic toenails reducing nails in thickness and removing devitalized tissue and debris.  Electrical and manual debridement performed.  Periodic care recommended.  Reappoint 3 months.  :  Assessment & Plan  Controlled type 1 diabetes mellitus with diabetic peripheral angiopathy without gangrene ([facility])    Lab Results   Component Value Date    HGBA1C 9.2 (H) 2025     Diabetic foot exam       Onychomycosis  Debridement           History of Present Illness   HPI  Ibrahima De Santiago is a 79 y.o. male who presents for yearly diabetic foot exam.  The patient denies numbness or tingling in his feet.  He has extremely thick toenails that he has difficulty cutting.  He ambulates with the aid of a cane.    I personally reviewed an A1c dated 3/31/2025.  It was 9.2.    I personally had an A1c dated 10/7/2024.  It was 10.2.      Review of Systems   Genitourinary:         Stage III renal disease   Neurological:  Positive for weakness and numbness.              Objective   There were no vitals taken for this visit.     Physical Exam    Cardiovascular:      Pulses: Pulses are weak.           Dorsalis pedis pulses are 0 on the right side and 0 on the left side.        Posterior tibial pulses are 0 on the right side and 0 on the left side.   Feet:      Right foot:      Skin integrity: No ulcer, skin breakdown, erythema, warmth, callus or dry skin.      Left foot:      Skin integrity: No ulcer, skin breakdown, erythema, warmth, callus or dry skin.          Diabetic Foot Exam    Patient's shoes and socks removed.    Right Foot/Ankle   Right Foot Inspection  Skin Exam: skin normal and skin intact. No dry skin, no warmth, no callus, no erythema, no maceration, no abnormal color, no pre-ulcer, no ulcer and no callus.     Toe Exam: ROM and strength within normal limits.     Sensory   Vibration: absent  Proprioception: intact  Monofilament testing: intact    Vascular  Capillary refills: < 3 seconds  The right DP pulse is 0. The right PT pulse is 0.     Right Toe  - Comprehensive Exam  Ecchymosis: none  Arch: normal  Hammertoes: absent  Claw Toes: absent  Swelling: none   Tenderness: none       Left Foot/Ankle  Left Foot Inspection  Skin Exam: skin normal and skin intact. No dry skin, no warmth, no erythema, no maceration, normal color, no pre-ulcer, no ulcer and no callus.     Toe Exam: ROM and strength within normal limits.     Sensory   Vibration: diminished  Proprioception: intact  Monofilament testing: diminished    Vascular  Capillary refills: < 3 seconds  The left DP pulse is 0. The left PT pulse is 0.     Left Toe  - Comprehensive Exam  Ecchymosis: none  Arch: normal  Hammertoes: absent  Claw toes: absent  Swelling: none   Tenderness: none       Assign Risk Category  No deformity present  No loss of protective sensation  Weak pulses  Risk: 0

## 2025-06-24 DIAGNOSIS — E10.65 TYPE 1 DIABETES MELLITUS WITH HYPERGLYCEMIA (HCC): ICD-10-CM

## 2025-06-25 RX ORDER — INSULIN ASPART 100 [IU]/ML
INJECTION, SOLUTION INTRAVENOUS; SUBCUTANEOUS
Qty: 15 ML | Refills: 5 | Status: SHIPPED | OUTPATIENT
Start: 2025-06-25

## 2025-07-14 ENCOUNTER — TELEPHONE (OUTPATIENT)
Age: 80
End: 2025-07-14

## 2025-07-14 NOTE — TELEPHONE ENCOUNTER
[facility]. [facility] is [facility] for [facility] back from the office.  they will not give out medication with out speaking to the office for clarification       [facility]   [phone] pharmacy -

## 2025-07-18 DIAGNOSIS — E10.65 TYPE 1 DIABETES MELLITUS WITH HYPERGLYCEMIA (HCC): ICD-10-CM

## 2025-07-18 NOTE — TELEPHONE ENCOUNTER
Spoke to [employer] from Pharmacy they are not sure who called and there is no notes prior on their side they said they would reach out if there is an issue with diabetic med and insulin refills